# Patient Record
Sex: FEMALE | Race: WHITE | NOT HISPANIC OR LATINO | Employment: OTHER | URBAN - METROPOLITAN AREA
[De-identification: names, ages, dates, MRNs, and addresses within clinical notes are randomized per-mention and may not be internally consistent; named-entity substitution may affect disease eponyms.]

---

## 2017-01-10 ENCOUNTER — CONVERSION ENCOUNTER (OUTPATIENT)
Dept: GENERAL RADIOLOGY | Facility: HOSPITAL | Age: 47
End: 2017-01-10

## 2017-01-20 ENCOUNTER — CONVERSION ENCOUNTER (OUTPATIENT)
Dept: GENERAL RADIOLOGY | Facility: HOSPITAL | Age: 47
End: 2017-01-20

## 2018-02-13 ENCOUNTER — CONVERSION ENCOUNTER (OUTPATIENT)
Dept: GENERAL RADIOLOGY | Facility: HOSPITAL | Age: 48
End: 2018-02-13

## 2019-01-09 ENCOUNTER — OFFICE VISIT CONVERTED (OUTPATIENT)
Dept: ORTHOPEDIC SURGERY | Facility: CLINIC | Age: 49
End: 2019-01-09
Attending: ORTHOPAEDIC SURGERY

## 2019-01-25 ENCOUNTER — OFFICE VISIT CONVERTED (OUTPATIENT)
Dept: ORTHOPEDIC SURGERY | Facility: CLINIC | Age: 49
End: 2019-01-25
Attending: ORTHOPAEDIC SURGERY

## 2019-03-26 ENCOUNTER — HOSPITAL ENCOUNTER (OUTPATIENT)
Dept: GENERAL RADIOLOGY | Facility: HOSPITAL | Age: 49
Discharge: HOME | End: 2019-03-26
Attending: OBSTETRICS & GYNECOLOGY

## 2019-08-20 ENCOUNTER — LAB (OUTPATIENT)
Dept: LAB | Facility: HOSPITAL | Age: 49
End: 2019-08-20

## 2019-08-20 ENCOUNTER — HOSPITAL ENCOUNTER (OUTPATIENT)
Dept: CARDIOLOGY | Facility: HOSPITAL | Age: 49
Discharge: HOME | End: 2019-08-20
Admitting: ORTHOPAEDIC SURGERY

## 2019-08-20 ENCOUNTER — TRANSCRIBE ORDERS (OUTPATIENT)
Dept: LAB | Facility: HOSPITAL | Age: 49
End: 2019-08-20

## 2019-08-20 DIAGNOSIS — Z01.818 PRE-OP TESTING: Primary | ICD-10-CM

## 2019-08-20 DIAGNOSIS — Z01.818 PRE-OP TESTING: ICD-10-CM

## 2019-08-20 LAB
ALBUMIN SERPL-MCNC: 5 G/DL (ref 3.5–5.2)
ALBUMIN/GLOB SERPL: 2 G/DL
ALP SERPL-CCNC: 73 U/L (ref 39–117)
ALT SERPL W P-5'-P-CCNC: 24 U/L (ref 1–33)
ANION GAP SERPL CALCULATED.3IONS-SCNC: 10.7 MMOL/L (ref 5–15)
AST SERPL-CCNC: 27 U/L (ref 1–32)
BASOPHILS # BLD AUTO: 0.03 10*3/MM3 (ref 0–0.2)
BASOPHILS NFR BLD AUTO: 0.4 % (ref 0–1.5)
BILIRUB SERPL-MCNC: 0.6 MG/DL (ref 0.2–1.2)
BUN BLD-MCNC: 9 MG/DL (ref 6–20)
BUN/CREAT SERPL: 12.5 (ref 7–25)
CALCIUM SPEC-SCNC: 9.9 MG/DL (ref 8.6–10.5)
CHLORIDE SERPL-SCNC: 100 MMOL/L (ref 98–107)
CO2 SERPL-SCNC: 26.3 MMOL/L (ref 22–29)
CREAT BLD-MCNC: 0.72 MG/DL (ref 0.57–1)
DEPRECATED RDW RBC AUTO: 45.5 FL (ref 37–54)
EOSINOPHIL # BLD AUTO: 0.03 10*3/MM3 (ref 0–0.4)
EOSINOPHIL NFR BLD AUTO: 0.4 % (ref 0.3–6.2)
ERYTHROCYTE [DISTWIDTH] IN BLOOD BY AUTOMATED COUNT: 13.7 % (ref 12.3–15.4)
GFR SERPL CREATININE-BSD FRML MDRD: 86 ML/MIN/1.73
GLOBULIN UR ELPH-MCNC: 2.5 GM/DL
GLUCOSE BLD-MCNC: 81 MG/DL (ref 65–99)
HCT VFR BLD AUTO: 41.8 % (ref 34–46.6)
HGB BLD-MCNC: 13.4 G/DL (ref 12–15.9)
IMM GRANULOCYTES # BLD AUTO: 0.02 10*3/MM3 (ref 0–0.05)
IMM GRANULOCYTES NFR BLD AUTO: 0.3 % (ref 0–0.5)
LYMPHOCYTES # BLD AUTO: 1.95 10*3/MM3 (ref 0.7–3.1)
LYMPHOCYTES NFR BLD AUTO: 28.3 % (ref 19.6–45.3)
MCH RBC QN AUTO: 28.9 PG (ref 26.6–33)
MCHC RBC AUTO-ENTMCNC: 32.1 G/DL (ref 31.5–35.7)
MCV RBC AUTO: 90.1 FL (ref 79–97)
MONOCYTES # BLD AUTO: 0.53 10*3/MM3 (ref 0.1–0.9)
MONOCYTES NFR BLD AUTO: 7.7 % (ref 5–12)
NEUTROPHILS # BLD AUTO: 4.34 10*3/MM3 (ref 1.7–7)
NEUTROPHILS NFR BLD AUTO: 62.9 % (ref 42.7–76)
NRBC BLD AUTO-RTO: 0 /100 WBC (ref 0–0.2)
PLATELET # BLD AUTO: 268 10*3/MM3 (ref 140–450)
PMV BLD AUTO: 10.3 FL (ref 6–12)
POTASSIUM BLD-SCNC: 4.4 MMOL/L (ref 3.5–5.2)
PROT SERPL-MCNC: 7.5 G/DL (ref 6–8.5)
RBC # BLD AUTO: 4.64 10*6/MM3 (ref 3.77–5.28)
SODIUM BLD-SCNC: 137 MMOL/L (ref 136–145)
WBC NRBC COR # BLD: 6.9 10*3/MM3 (ref 3.4–10.8)

## 2019-08-20 PROCEDURE — 85025 COMPLETE CBC W/AUTO DIFF WBC: CPT

## 2019-08-20 PROCEDURE — 36415 COLL VENOUS BLD VENIPUNCTURE: CPT

## 2019-08-20 PROCEDURE — 80053 COMPREHEN METABOLIC PANEL: CPT

## 2019-08-20 PROCEDURE — 93005 ELECTROCARDIOGRAM TRACING: CPT | Performed by: ORTHOPAEDIC SURGERY

## 2019-08-20 PROCEDURE — 93010 ELECTROCARDIOGRAM REPORT: CPT | Performed by: INTERNAL MEDICINE

## 2020-02-04 ENCOUNTER — OFFICE VISIT CONVERTED (OUTPATIENT)
Dept: SURGERY | Facility: CLINIC | Age: 50
End: 2020-02-04
Attending: NURSE PRACTITIONER

## 2020-02-10 ENCOUNTER — OFFICE VISIT CONVERTED (OUTPATIENT)
Dept: ORTHOPEDIC SURGERY | Facility: CLINIC | Age: 50
End: 2020-02-10
Attending: ORTHOPAEDIC SURGERY

## 2020-02-10 ENCOUNTER — CONVERSION ENCOUNTER (OUTPATIENT)
Dept: ORTHOPEDIC SURGERY | Facility: CLINIC | Age: 50
End: 2020-02-10

## 2020-03-02 ENCOUNTER — HOSPITAL ENCOUNTER (OUTPATIENT)
Dept: URGENT CARE | Facility: CLINIC | Age: 50
Discharge: HOME | End: 2020-03-02
Attending: NURSE PRACTITIONER

## 2020-03-16 ENCOUNTER — HOSPITAL ENCOUNTER (OUTPATIENT)
Dept: GASTROENTEROLOGY | Facility: HOSPITAL | Age: 50
Setting detail: HOSPITAL OUTPATIENT SURGERY
Discharge: HOME | End: 2020-03-16
Attending: SURGERY

## 2020-03-16 LAB — HCG UR QL: NEGATIVE

## 2020-09-15 ENCOUNTER — HOSPITAL ENCOUNTER (OUTPATIENT)
Dept: GENERAL RADIOLOGY | Facility: HOSPITAL | Age: 50
Discharge: HOME | End: 2020-09-15
Attending: OBSTETRICS & GYNECOLOGY

## 2020-11-04 ENCOUNTER — CONVERSION ENCOUNTER (OUTPATIENT)
Dept: ORTHOPEDIC SURGERY | Facility: CLINIC | Age: 50
End: 2020-11-04

## 2020-11-04 ENCOUNTER — OFFICE VISIT CONVERTED (OUTPATIENT)
Dept: ORTHOPEDIC SURGERY | Facility: CLINIC | Age: 50
End: 2020-11-04
Attending: ORTHOPAEDIC SURGERY

## 2020-11-05 ENCOUNTER — HOSPITAL ENCOUNTER (OUTPATIENT)
Dept: GENERAL RADIOLOGY | Facility: HOSPITAL | Age: 50
Discharge: HOME | End: 2020-11-05
Attending: OBSTETRICS & GYNECOLOGY

## 2020-11-30 ENCOUNTER — OFFICE VISIT CONVERTED (OUTPATIENT)
Dept: FAMILY MEDICINE CLINIC | Facility: CLINIC | Age: 50
End: 2020-11-30
Attending: PHYSICIAN ASSISTANT

## 2020-11-30 ENCOUNTER — CONVERSION ENCOUNTER (OUTPATIENT)
Dept: FAMILY MEDICINE CLINIC | Facility: CLINIC | Age: 50
End: 2020-11-30

## 2021-01-05 ENCOUNTER — HOSPITAL ENCOUNTER (OUTPATIENT)
Dept: LAB | Facility: HOSPITAL | Age: 51
Discharge: HOME | End: 2021-01-05
Attending: PHYSICIAN ASSISTANT

## 2021-01-05 LAB
25(OH)D3 SERPL-MCNC: 44.8 NG/ML (ref 30–100)
ALBUMIN SERPL-MCNC: 4.3 G/DL (ref 3.5–5)
ALBUMIN/GLOB SERPL: 1.5 {RATIO} (ref 1.4–2.6)
ALP SERPL-CCNC: 76 U/L (ref 42–98)
ALT SERPL-CCNC: 36 U/L (ref 10–40)
ANION GAP SERPL CALC-SCNC: 14 MMOL/L (ref 8–19)
AST SERPL-CCNC: 43 U/L (ref 15–50)
BASOPHILS # BLD AUTO: 0.03 10*3/UL (ref 0–0.2)
BASOPHILS NFR BLD AUTO: 0.4 % (ref 0–3)
BILIRUB SERPL-MCNC: 0.55 MG/DL (ref 0.2–1.3)
BUN SERPL-MCNC: 13 MG/DL (ref 5–25)
BUN/CREAT SERPL: 15 {RATIO} (ref 6–20)
CALCIUM SERPL-MCNC: 9.1 MG/DL (ref 8.7–10.4)
CHLORIDE SERPL-SCNC: 102 MMOL/L (ref 99–111)
CHOLEST SERPL-MCNC: 218 MG/DL (ref 107–200)
CHOLEST/HDLC SERPL: 2.3 {RATIO} (ref 3–6)
CONV ABS IMM GRAN: 0.07 10*3/UL (ref 0–0.2)
CONV CO2: 25 MMOL/L (ref 22–32)
CONV IMMATURE GRAN: 0.9 % (ref 0–1.8)
CONV TOTAL PROTEIN: 7.1 G/DL (ref 6.3–8.2)
CREAT UR-MCNC: 0.89 MG/DL (ref 0.5–0.9)
DEPRECATED RDW RBC AUTO: 45.4 FL (ref 36.4–46.3)
EOSINOPHIL # BLD AUTO: 0.05 10*3/UL (ref 0–0.7)
EOSINOPHIL # BLD AUTO: 0.6 % (ref 0–7)
ERYTHROCYTE [DISTWIDTH] IN BLOOD BY AUTOMATED COUNT: 14 % (ref 11.7–14.4)
FOLATE SERPL-MCNC: 9.2 NG/ML (ref 4.8–20)
GFR SERPLBLD BASED ON 1.73 SQ M-ARVRAT: >60 ML/MIN/{1.73_M2}
GLOBULIN UR ELPH-MCNC: 2.8 G/DL (ref 2–3.5)
GLUCOSE SERPL-MCNC: 74 MG/DL (ref 65–99)
HCT VFR BLD AUTO: 37.8 % (ref 37–47)
HDLC SERPL-MCNC: 95 MG/DL (ref 40–60)
HGB BLD-MCNC: 12 G/DL (ref 12–16)
LDLC SERPL CALC-MCNC: 110 MG/DL (ref 70–100)
LYMPHOCYTES # BLD AUTO: 1.78 10*3/UL (ref 1–5)
LYMPHOCYTES NFR BLD AUTO: 22.8 % (ref 20–45)
MCH RBC QN AUTO: 28.4 PG (ref 27–31)
MCHC RBC AUTO-ENTMCNC: 31.7 G/DL (ref 33–37)
MCV RBC AUTO: 89.4 FL (ref 81–99)
MONOCYTES # BLD AUTO: 0.45 10*3/UL (ref 0.2–1.2)
MONOCYTES NFR BLD AUTO: 5.8 % (ref 3–10)
NEUTROPHILS # BLD AUTO: 5.43 10*3/UL (ref 2–8)
NEUTROPHILS NFR BLD AUTO: 69.5 % (ref 30–85)
NRBC CBCN: 0 % (ref 0–0.7)
OSMOLALITY SERPL CALC.SUM OF ELEC: 283 MOSM/KG (ref 273–304)
PLATELET # BLD AUTO: 351 10*3/UL (ref 130–400)
PMV BLD AUTO: 10.2 FL (ref 9.4–12.3)
POTASSIUM SERPL-SCNC: 3.9 MMOL/L (ref 3.5–5.3)
RBC # BLD AUTO: 4.23 10*6/UL (ref 4.2–5.4)
SODIUM SERPL-SCNC: 137 MMOL/L (ref 135–147)
TRIGL SERPL-MCNC: 66 MG/DL (ref 40–150)
VIT B12 SERPL-MCNC: 566 PG/ML (ref 211–911)
VLDLC SERPL-MCNC: 13 MG/DL (ref 5–37)
WBC # BLD AUTO: 7.81 10*3/UL (ref 4.8–10.8)

## 2021-04-08 ENCOUNTER — HOSPITAL ENCOUNTER (OUTPATIENT)
Dept: VACCINE CLINIC | Facility: HOSPITAL | Age: 51
Discharge: HOME | End: 2021-04-08
Attending: INTERNAL MEDICINE

## 2021-04-29 ENCOUNTER — HOSPITAL ENCOUNTER (OUTPATIENT)
Dept: VACCINE CLINIC | Facility: HOSPITAL | Age: 51
Discharge: HOME | End: 2021-04-29
Attending: INTERNAL MEDICINE

## 2021-05-05 ENCOUNTER — OFFICE VISIT CONVERTED (OUTPATIENT)
Dept: ORTHOPEDIC SURGERY | Facility: CLINIC | Age: 51
End: 2021-05-05
Attending: PHYSICIAN ASSISTANT

## 2021-05-10 NOTE — H&P
History and Physical      Patient Name: Melissa Ruth   Patient ID: [id number]   Sex: Female   YOB: 1970    Primary Care Provider: Cathy DENNIS   Referring Provider: Cathy DENNIS    Visit Date: November 4, 2020    Provider: Emery Sanford MD   Location: [facility]   Location Address: [address] [place] [address]  [id number]   Location Phone: [phone]          Chief Complaint  · Left Knee Pain      History Of Present Illness  Melissa Ruth is a 50 year old /White female who presents today to [facility].      Patient presents today with a chief complaint of left knee pain. Patient presents today with MRI results from 7/2/20. Patient states her knee is catching and has a lot of pain on the medial and lateral joint line. She states she has some pain on the posterior aspect of her knee as well. She was given a knee brace to wear for 3 months because her knee was shifted over that did help give her some relief of pain.       Past Medical History  Alcoholism; Allergies; Anxiety; Arthritis; Arthritis; Bursitis: Pre-Patella, Left; Chemical dependency; Depression; Gastrocnemius strain, left; Hypothyroidism; Hypothyroidism; Night sweats; Seasonal allergies; Thyroid disorder; Ulcer         Past Surgical History  *Metal Implant; Colonoscopy; Knee surgery; Metal implants; Tonsillectomy; Wrist Surgery         Medication List  Diflucan 100 mg oral tablet; nystatin 100,000 unit/mL oral suspension; OsmoPrep 1.5 gram oral tablet; Tirosint 88 mcg oral capsule         Allergy List  PENICILLINS       Allergies Reconciled  Family Medical History  Lung Neoplasm, Malignant; Stroke; Family history of certain chronic disabling diseases; arthritis; Family history of Arthritis         Social History  Alcohol Use (Never); Claustophobic (Unknown); lives with children; lives with spouse; .; Recreational Drug Use (Never); Tobacco (Former); Working         Review of  "Systems  · Constitutional  o Denies  o : fever, chills, weight loss  · Cardiovascular  o Denies  o : chest pain, shortness of breath  · Gastrointestinal  o Denies  o : liver disease, heartburn, nausea, blood in stools  · Genitourinary  o Denies  o : painful urination, blood in urine  · Integument  o Denies  o : rash, itching  · Neurologic  o Denies  o : headache, weakness, loss of consciousness  · Musculoskeletal  o Denies  o : painful, swollen joints  · Psychiatric  o Denies  o : drug/alcohol addiction, anxiety, depression      Vitals  Date Time BP Position Site L\R Cuff Size HR RR TEMP (F) WT  HT  BMI kg/m2 BSA m2 O2 Sat FR L/min FiO2 HC       11/04/2020 01:02 PM         129lbs 0oz 5'  5\" 21.47 1.64             Physical Examination  · Constitutional  o Appearance  o : well developed, well-nourished, no obvious deformities present  · Head and Face  o Head  o :   § Inspection  § : normocephalic  o Face  o :   § Inspection  § : no facial lesions  · Eyes  o Conjunctivae  o : conjunctivae normal  o Sclerae  o : sclerae white  · Ears, Nose, Mouth and Throat  o Ears  o :   § External Ears  § : appearance within normal limits  § Hearing  § : intact  o Nose  o :   § External Nose  § : appearance normal  · Neck  o Inspection/Palpation  o : normal appearance  o Range of Motion  o : full range of motion  · Respiratory  o Respiratory Effort  o : breathing unlabored  o Inspection of Chest  o : normal appearance  o Auscultation of Lungs  o : no audible wheezing or rales  · Cardiovascular  o Heart  o : regular rate  · Gastrointestinal  o Abdominal Examination  o : soft and non-tender  · Skin and Subcutaneous Tissue  o General Inspection  o : intact, no rashes  · Psychiatric  o General  o : Alert and oriented x3  o Judgement and Insight  o : judgment and insight intact  o Mood and Affect  o : mood normal, affect appropriate  · Left Knee  o Inspection  o : Sensation grossly intact. Neurovascular intact. Pulses normal. Calf supple, " non-tender. Skin intact. Mild swelling. No skin discoloration or atrophy. Full weightbearing. Good strength in quadriceps, hamstrings, dorsiflexors, and plantar flexors. Full flexion and extension.  · In Office Procedures  o View  o : LAT/SUNRISE/STANDING   o Site  o : left, knee  o Indication  o : Left Knee Pain  o Study  o : X-rays ordered, taken in the office, and reviewed today.  o Xray  o : Negative signs for fracture or dislocation. Cartilage of the posterior aspect of the knee. Mild to moderate osteoarthritis. [employer] rides a little bit high.   · Imaging  o Imaging  o : [MRI 7/2/20] 1. Progressive superficial prepatellar and infrapatellar bursal fluid/edema/bursitis since January 2020. 2. Diminished effusion and popliteal cyst. 3. Patellofemoral chondromalacia is unchanged. Minimal marrow edema of the patella is noted medially. Trochlear marrow edema has decreased. 4. Medial compartment chondromalacia is stable. Tibial marrow edema has resolved. 5. Minimal blunting free margin posterior horn medial meniscus without a distinct tear or change. 6. Posterior lateral tibial chondromalacia is unchanged. Underlying marrow edema is minimally improved. 7. Stable tiny undersurface tear posterior body lateral meniscus. History sheet. No fracture or loose body. 8. Progressive subarticular cystlike change and marrow edema posterior superior nonweightbearing medial femoral condyle is stable overlying moderate grade chondromalacia.           Assessment  · Primary osteoarthritis of left knee     715.16/M17.12  · Left knee pain, unspecified chronicity     719.46/M25.562      Plan  · Orders  o Knee (Left) [facility] ([id number]) - 719.46/M25.562 - 11/04/2020  · Medications  o Medications have been Reconciled  o Transition of Care or Provider Policy  · Instructions  o Dr. Sanford saw and examined the patient and agrees with plan.   o MRI reviewed by Dr. Sanford.  o Reviewed the patient's Past Medical, Social, and Family history  as well as the ROS at today's visit, no changes.  o Call or return if worsening symptoms.  o Follow Up PRN.  o This note was transcribed by Rosa Osman. sallie  o Discussed diagnosis and treatment options with the patient. Patient opted to see if her insurance will approve of the gel injection.            Electronically Signed by: Rosa Osman-, Other -Author on November 5, 2020 11:42:42 AM  Electronically Co-signed by: Emery Sanford MD -Reviewer on November 5, 2020 08:09:18 PM

## 2021-05-10 NOTE — H&P
History and Physical      Patient Name: Melissa Ruth   Patient ID: [id number]   Sex: Female   YOB: 1970    Primary Care Provider: Lluvia ZAIDI   Referring Provider: Cathy DENNIS    Visit Date: November 30, 2020    Provider: DEJUAN Carrero   Location: [facility]   Location Address: [address] [place] [address], [address]  [place], [place]  [id number]   Location Phone: [phone]          Chief Complaint  · new patient - establish care      History Of Present Illness  Melissa Ruth is a 50 year old /White female who presents for evaluation and treatment of:      new patient to establish care.   previously saw Leah Lee NP in [place].    patient requesting flu shot  refill Vitamin D3; h/o b12 deficiency.    PMH - hypothyroidism    hypothyroidism - taking tirosint. patient states she is doing well on medication; followed by Dr. RADHA la 6mth  patient had COVID19 beginning of October, doing well now    last pap - 08/2020; Marcela  mammo - 04/2020; CHATO  colon - 03/2020; Dr. Armstrong  labs - 01/2020       Past Medical History  Disease Name Date Onset Notes   Alcoholism --  --    Allergies --  --    Anxiety --  --    Arthritis --  --    Arthritis --  --    Bursitis: Pre-Patella, Left 09/05/2017 --    Chemical dependency --  --    Depression --  --    Gastrocnemius strain, left 09/05/2017 --    Hypothyroidism --  --    Hypothyroidism 08/18/2015 --    Night sweats --  --    Seasonal allergies --  --    Thyroid disorder --  --    Ulcer --  --          Past Surgical History  Procedure Name Date Notes   *Metal Implant --  --    Colonoscopy --  --    Knee surgery 1997 --    Metal implants --  --    Tonsillectomy --  --    Wrist Surgery 2014 --          Medication List  Name Date Started Instructions   Tirosint 88 mcg oral capsule  take 1 capsule (88 mcg) by oral route once daily   Vitamin D3 125 mcg (5,000 unit) oral tablet 11/30/2020 take 1 tablet by oral route  daily for 30 days         Allergy List  Allergen Name Date Reaction Notes   PENICILLINS --  --  --        Allergies Reconciled  Family Medical History  Disease Name Relative/Age Notes   Lung Neoplasm, Malignant Uncle/   --    Stroke Father/   Father  Father  grandparents   Family history of certain chronic disabling diseases; arthritis Father/  Mother/   Mother; Father  Mother   Family history of Arthritis Brother/  Father/  Mother/   Mother; Father; Brother  Mother; Father         Social History  Finding Status Start/Stop Quantity Notes   Alcohol Use Never --/-- --  does not drink   Claustophobic Unknown --/-- --  yes   lives with children --  --/-- --  --    lives with spouse --  --/-- --  --    . --  --/-- --  --    Recreational Drug Use Never --/-- --  no  no  no  in the past   Tobacco Former --/-- 1 PPD former smoker, 1 packs per day, smoked 6-10 years  former smoker, smoked 6-10 years  former smoker, smoked 6-10 years  former smoker  smoked 12-13 years   Working --  --/-- --  --          Review of Systems  · Constitutional  o Denies  o : fever, headache, chills  · Eyes  o Denies  o : eye pain, double vision, blurred vision  · HENT  o Denies  o : sinus problems, sore throat, ear infection  · Cardiovascular  o Denies  o : chest pain, high blood pressure, varicosities  · Respiratory  o Denies  o : shortness of breath, wheezing, frequent cough  · Gastrointestinal  o Denies  o : nausea, vomiting, heartburn, indigestion, abdominal pain  · Genitourinary  o Denies  o : urgency, frequency, urinary retention, painful urination  · Integument  o Denies  o : rash, itching, boils  · Neurologic  o Denies  o : tingling or numbness, tremors, dizzy spells  · Musculoskeletal  o Denies  o : joint pain, neck pain, back pain  · Endocrine  o Denies  o : cold intolerance, heat intolerance, tired, excessive thirst, sluggish  · Psychiatric  o Denies  o : severe depression, concerns with hurting  "themselves  · Heme-Lymph  o Denies  o : swollen glands, blood clotting problems  · Allergic-Immunologic  o Denies  o : sinus allergy symptoms, hay fever      Vitals  Date Time BP Position Site L\R Cuff Size HR RR TEMP (F) WT  HT  BMI kg/m2 BSA m2 O2 Sat FR L/min FiO2 HC       02/10/2020 01:28 PM      79 - R   129lbs 6oz 5'  5\" 21.53 1.64 99 %      11/04/2020 01:02 PM         129lbs 0oz 5'  5\" 21.47 1.64       11/30/2020 10:08 /83 Sitting    74 - R  97.5 131lbs 8oz 5'  5\" 21.88 1.65 100 %  21%          Physical Examination  · Constitutional  o Appearance  o : well developed, well-nourished, no acute distress  · Head and Face  o Head  o : normocephalic, atraumatic  · Neck  o Inspection/Palpation  o : normal appearance, no masses or tenderness, trachea midline  o Thyroid  o : gland size normal, nontender, no nodules or masses present on palpation  · Respiratory  o Respiratory Effort  o : breathing unlabored  o Inspection of Chest  o : chest rise symmetric bilaterally  o Auscultation of Lungs  o : clear to auscultation bilaterally throughout inspiration and expiration  · Cardiovascular  o Heart  o :   § Auscultation of Heart  § : regular rate and rhythm, no murmurs, gallops or rubs  o Peripheral Vascular System  o :   § Extremities  § : no edema  · Lymphatic  o Neck  o : no cervical lymphadenopathy, no supraclavicular lymphadenopathy  · Psychiatric  o Mood and Affect  o : mood normal, affect appropriate              Assessment  · Screening for depression     V79.0/Z13.89  · Need for influenza vaccination     V04.81/Z23  · Screening for lipid disorders     V77.91/Z13.220  · Hypothyroidism     244.9/E03.9  · Vitamin D deficiency     268.9/E55.9  · B12 deficiency     266.2/E53.8  · Establishing care with new doctor, encounter for     V65.8/Z76.89    Problems Reconciled  Plan  · Orders  o ACO-18: Negative screen for clinical depression using a standardized tool () - V79.0/Z13.89 - 11/30/2020   0  o Immunization " Admin Fee (Single) ([facility]) (44083) - V04.81/Z23 - 11/30/2020  o Fluzone Quadrivalent Vaccine, age 6 months + (50647) - V04.81/Z23 - 11/30/2020   Vaccine - Influenza; Dose: 0.5; Site: Right Deltoid; Route: Intramuscular; Date: 11/30/2020 10:18:00; Exp: 06/30/2021; Lot: [id number]; Mfg: Clip pasteur; TradeName: Fluzone Quadrivalent; Administered By: Misty Pardo MA; Comment: NDC: [id number] patient tolerated well  o ACO-14: Influenza immunization administered or previously received () - V04.81/Z23 - 11/30/2020  o Lipid Panel [facility] (48730) - V77.91/Z13.220 - 11/30/2020  o Vitamin D Level (43442) - 268.9/E55.9 - 11/30/2020  o CBC with Auto Diff [facility] (05171) - 268.9/E55.9, 266.2/E53.8 - 11/30/2020  o CMP [facility] (00881) - V77.91/Z13.220, 268.9/E55.9, 266.2/E53.8 - 11/30/2020  o ACO-39: Current medications updated and reviewed (, 1159F) - - 11/30/2020  o B12 Folate levels (B12FO) - 266.2/E53.8 - 11/30/2020  · Medications  o Vitamin D3 125 mcg (5,000 unit) oral tablet   SIG: take 1 tablet by oral route daily for 30 days   DISP: (30) Tablet with 5 refills  Prescribed on 11/30/2020     o Medications have been Reconciled  o Transition of Care or Provider Policy  · Instructions  o Depression Screen completed and scanned into the EMR under the designated folder within the patient's documents.  o Today's PHQ-9 result is _0__  o Take all medications as prescribed/directed.  o Patient was educated/instructed on their diagnosis, treatment and medications prior to discharge from the clinic today.  o Chronic conditions reviewed and taken into consideration for today's treatment plan.  o Discussed Covid-19 precautions including, but not limited to, social distancing, avoid touching your face, and hand washing.   · Disposition  o Follow Up in 6 months.            Electronically Signed by: DEJUAN Carrero -Author on November 30, 2020 10:59:18 AM

## 2021-05-12 ENCOUNTER — OFFICE VISIT CONVERTED (OUTPATIENT)
Dept: ORTHOPEDIC SURGERY | Facility: CLINIC | Age: 51
End: 2021-05-12
Attending: PHYSICIAN ASSISTANT

## 2021-05-14 VITALS
DIASTOLIC BLOOD PRESSURE: 83 MMHG | WEIGHT: 131.5 LBS | HEIGHT: 65 IN | BODY MASS INDEX: 21.91 KG/M2 | SYSTOLIC BLOOD PRESSURE: 121 MMHG | TEMPERATURE: 97.5 F | OXYGEN SATURATION: 100 % | HEART RATE: 74 BPM

## 2021-05-14 VITALS — WEIGHT: 129 LBS | HEIGHT: 65 IN | BODY MASS INDEX: 21.49 KG/M2

## 2021-05-15 VITALS — BODY MASS INDEX: 21.49 KG/M2 | RESPIRATION RATE: 12 BRPM | WEIGHT: 129 LBS | HEIGHT: 65 IN

## 2021-05-15 VITALS — HEIGHT: 65 IN | BODY MASS INDEX: 21.55 KG/M2 | HEART RATE: 79 BPM | OXYGEN SATURATION: 99 % | WEIGHT: 129.37 LBS

## 2021-05-16 VITALS — WEIGHT: 129 LBS | HEIGHT: 65 IN | BODY MASS INDEX: 21.49 KG/M2 | OXYGEN SATURATION: 98 % | HEART RATE: 71 BPM

## 2021-05-16 VITALS — WEIGHT: 129 LBS | BODY MASS INDEX: 21.49 KG/M2 | HEIGHT: 65 IN

## 2021-05-19 ENCOUNTER — CONVERSION ENCOUNTER (OUTPATIENT)
Dept: ORTHOPEDIC SURGERY | Facility: CLINIC | Age: 51
End: 2021-05-19

## 2021-05-19 ENCOUNTER — OFFICE VISIT CONVERTED (OUTPATIENT)
Dept: ORTHOPEDIC SURGERY | Facility: CLINIC | Age: 51
End: 2021-05-19
Attending: PHYSICIAN ASSISTANT

## 2021-06-05 NOTE — PROGRESS NOTES
Progress Note      Patient Name: Melissa Ruth   Patient ID: [id number]   Sex: Female   YOB: 1970    Primary Care Provider: Lluvia ZAIDI   Referring Provider: Cathy DENNIS    Visit Date: May 19, 2021    Provider: Trish Sun PA-C   Location: [facility]   Location Address: [address] [place] [address]  [id number]   Location Phone: [phone]          Chief Complaint  · Left knee pain       History Of Present Illness  Melissa Ruth is a 51 year old /White female who presents today to [facility]. Patient presents today for left knee pain, left knee osteoarthritis. She is here today for her 3rd Euflexxa injection.       Past Medical History  Alcoholism; Allergies; Anxiety; Arthritis; Arthritis; Bursitis: Pre-Patella, Left; Chemical dependency; Depression; Gastrocnemius strain, left; Hypothyroidism; Hypothyroidism; Night sweats; Seasonal allergies; Thyroid disorder; Ulcer         Past Surgical History  *Metal Implant; Colonoscopy; Knee surgery; Metal implants; Tonsillectomy; Wrist Surgery         Medication List  Tirosint 88 mcg oral capsule; Vitamin D3 125 mcg (5,000 unit) oral tablet         Allergy List  PENICILLINS       Allergies Reconciled  Family Medical History  Lung Neoplasm, Malignant; Stroke; Family history of certain chronic disabling diseases; arthritis; Family history of Arthritis         Social History  Alcohol Use (Never); Claustophobic (Unknown); lives with children; lives with spouse; .; Recreational Drug Use (Never); Tobacco (Former); Working         Immunizations  Name Date Admin   Influenza 11/30/2020         Review of Systems  · Constitutional  o Denies  o : fever, chills, weight loss  · Cardiovascular  o Denies  o : chest pain, shortness of breath  · Gastrointestinal  o Denies  o : liver disease, heartburn, nausea, blood in stools  · Genitourinary  o Denies  o : painful urination, blood in  "urine  · Integument  o Denies  o : rash, itching  · Neurologic  o Denies  o : headache, weakness, loss of consciousness  · Musculoskeletal  o Denies  o : painful, swollen joints  · Psychiatric  o Denies  o : drug/alcohol addiction, anxiety, depression      Vitals  Date Time BP Position Site L\R Cuff Size HR RR TEMP (F) WT  HT  BMI kg/m2 BSA m2 O2 Sat FR L/min FiO2        05/19/2021 01:11 PM      74 - R   132lbs 4oz 5'  5\" 22.01 1.66 98 %            Physical Examination  · Constitutional  o Appearance  o : well developed, well-nourished, no obvious deformities present  · Head and Face  o Head  o :   § Inspection  § : normocephalic  o Face  o :   § Inspection  § : no facial lesions  · Eyes  o Conjunctivae  o : conjunctivae normal  o Sclerae  o : sclerae white  · Ears, Nose, Mouth and Throat  o Ears  o :   § External Ears  § : appearance within normal limits  § Hearing  § : intact  o Nose  o :   § External Nose  § : appearance normal  · Neck  o Inspection/Palpation  o : normal appearance  o Range of Motion  o : full range of motion  · Respiratory  o Respiratory Effort  o : breathing unlabored  o Inspection of Chest  o : normal appearance  o Auscultation of Lungs  o : no audible wheezing or rales  · Cardiovascular  o Heart  o : regular rate  · Gastrointestinal  o Abdominal Examination  o : soft and non-tender  · Skin and Subcutaneous Tissue  o General Inspection  o : intact, no rashes  · Psychiatric  o General  o : Alert and oriented x3  o Judgement and Insight  o : judgment and insight intact  o Mood and Affect  o : mood normal, affect appropriate  · Left Knee  o Inspection  o : No atrophy, discoloration, tenderness, deformity or swelling. Tenderness of medial joint line. Full AROM with flexion and extension. Sensation intact. N/v intact. Calf supple, nontender.   · Injection Note/Aspiration Note  o Site  o : left knee  o Procedure  o : Procedure: After educating the patient, patient gave consent for procedure. After " using Chloraprep, the joint space was injected. The patient tolerated the procedure well.  o Medication  o : Sample Euflexxa, 20 mg          Assessment  · Primary osteoarthritis of left knee     715.16/M17.12      Plan  · Orders  o Euflexxa per dose () - 715.16/M17.12 - 05/19/2021   Lot [id number] Exp 09 28 2022 Ferraida Administered by Trish Sun PA-C   o Knee Intra-articular Injection without US Guidance [facility] (05255) - 715.16/M17.12 - 05/19/2021   Administered by Trish Sun PA-C   · Medications  o Medications have been Reconciled  o Transition of Care or Provider Policy  · Instructions  o Reviewed the patient's Past Medical, Social, and Family history as well as the ROS at today's visit, no changes.  o Call or return if worsening symptoms.  o Patient will follow up in the fall and consider injections for her right knee at that time, if insurance will cover.            Electronically Signed by: Trish Sun PA-C -Author on May 19, 2021 01:22:55 PM

## 2021-06-05 NOTE — PROGRESS NOTES
Progress Note      Patient Name: Melissa Ruth   Patient ID: [id number]   Sex: Female   YOB: 1970    Primary Care Provider: Lluvia ZAIDI   Referring Provider: Cathy DENNIS    Visit Date: May 5, 2021    Provider: Trish Sun PA-C   Location: [facility]   Location Address: [address] [place] [address]  [id number]   Location Phone: [phone]          Chief Complaint  · Left knee pain       History Of Present Illness  Melissa Ruth is a 51 year old /White female who presents today to [facility]. Patient presents today to receive left knee Euflexxa injection #1.       Past Medical History  Alcoholism; Allergies; Anxiety; Arthritis; Arthritis; Bursitis: Pre-Patella, Left; Chemical dependency; Depression; Gastrocnemius strain, left; Hypothyroidism; Hypothyroidism; Night sweats; Seasonal allergies; Thyroid disorder; Ulcer         Past Surgical History  *Metal Implant; Colonoscopy; Knee surgery; Metal implants; Tonsillectomy; Wrist Surgery         Medication List  Tirosint 88 mcg oral capsule; Vitamin D3 125 mcg (5,000 unit) oral tablet         Allergy List  PENICILLINS       Allergies Reconciled  Family Medical History  Lung Neoplasm, Malignant; Stroke; Family history of certain chronic disabling diseases; arthritis; Family history of Arthritis         Social History  Alcohol Use (Never); Claustophobic (Unknown); lives with children; lives with spouse; .; Recreational Drug Use (Never); Tobacco (Former); Working         Immunizations  Name Date Admin   Influenza 11/30/2020         Review of Systems  · Constitutional  o Denies  o : fever, chills, weight loss  · Cardiovascular  o Denies  o : chest pain, shortness of breath  · Gastrointestinal  o Denies  o : liver disease, heartburn, nausea, blood in stools  · Genitourinary  o Denies  o : painful urination, blood in urine  · Integument  o Denies  o : rash, itching  · Neurologic  o Denies  o :  "headache, weakness, loss of consciousness  · Musculoskeletal  o Denies  o : painful, swollen joints  · Psychiatric  o Denies  o : drug/alcohol addiction, anxiety, depression      Vitals  Date Time BP Position Site L\R Cuff Size HR RR TEMP (F) WT  HT  BMI kg/m2 BSA m2 O2 Sat FR L/min FiO2 HC       05/05/2021 01:21 PM      82 - R   130lbs 4oz 5'  5\" 21.67 1.65 92 %            Physical Examination  · Constitutional  o Appearance  o : well developed, well-nourished, no obvious deformities present  · Head and Face  o Head  o :   § Inspection  § : normocephalic  o Face  o :   § Inspection  § : no facial lesions  · Eyes  o Conjunctivae  o : conjunctivae normal  o Sclerae  o : sclerae white  · Ears, Nose, Mouth and Throat  o Ears  o :   § External Ears  § : appearance within normal limits  § Hearing  § : intact  o Nose  o :   § External Nose  § : appearance normal  · Neck  o Inspection/Palpation  o : normal appearance  o Range of Motion  o : full range of motion  · Respiratory  o Respiratory Effort  o : breathing unlabored  o Inspection of Chest  o : normal appearance  o Auscultation of Lungs  o : no audible wheezing or rales  · Cardiovascular  o Heart  o : regular rate  · Gastrointestinal  o Abdominal Examination  o : soft and non-tender  · Skin and Subcutaneous Tissue  o General Inspection  o : intact, no rashes  · Psychiatric  o General  o : Alert and oriented x3  o Judgement and Insight  o : judgment and insight intact  o Mood and Affect  o : mood normal, affect appropriate  · Left Knee  o Inspection  o : No atrophy, swelling, tenderness, or deformity. Muscle strength is 5/5 of the quadriceps and hamstrings. Stable to varus/valgus stress. Sensation is intact. N/v intact. Normal gait. Posterior tibialis pulse is 2+.  · Injection Note/Aspiration Note  o Site  o : left knee  o Procedure  o : Procedure: After educating the patient, patient gave consent for procedure. After using Chloraprep, the joint space was injected. The " patient tolerated the procedure well.  o Medication  o : Euflexxa, 20 mg sample           Assessment  · Primary osteoarthritis of left knee     715.16/M17.12      Plan  · Orders  o Euflexxa per dose () - 715.16/M17.12 - 05/05/2021   Sample Lot [id number] Exp 09 28 2022 Ferraida Administered by Trish Sun PA-C   o Knee Intra-articular Injection without US Guidance [facility] (64007) - 715.16/M17.12 - 05/05/2021   Administered by Trish Sun PA-C  · Medications  o Medications have been Reconciled  o Transition of Care or Provider Policy  · Instructions  o Reviewed the patient's Past Medical, Social, and Family history as well as the ROS at today's visit, no changes.  o Call or return if worsening symptoms.  o Follow Up PRN.            Electronically Signed by: Trish Sun PA-C -Author on May 5, 2021 02:20:25 PM  Electronically Co-signed by: Emery Sanford MD -Reviewer on May 10, 2021 12:11:01 PM

## 2021-06-05 NOTE — PROGRESS NOTES
Progress Note      Patient Name: Melissa Ruth   Patient ID: [id number]   Sex: Female   YOB: 1970    Primary Care Provider: Lluvia ZAIDI   Referring Provider: Cathy DENNIS    Visit Date: May 12, 2021    Provider: Trish Sun PA-C   Location: [facility]   Location Address: [address] [place] [address]  [id number]   Location Phone: [phone]          Chief Complaint  · Left knee pain       History Of Present Illness  Melissa Ruth is a 51 year old /White female who presents today to [facility]. Patient presents today for her second Euflexxa injection of her left knee. She states the first injection gave her relief.       Past Medical History  Alcoholism; Allergies; Anxiety; Arthritis; Arthritis; Bursitis: Pre-Patella, Left; Chemical dependency; Depression; Gastrocnemius strain, left; Hypothyroidism; Hypothyroidism; Night sweats; Seasonal allergies; Thyroid disorder; Ulcer         Past Surgical History  *Metal Implant; Colonoscopy; Knee surgery; Metal implants; Tonsillectomy; Wrist Surgery         Medication List  Tirosint 88 mcg oral capsule; Vitamin D3 125 mcg (5,000 unit) oral tablet         Allergy List  PENICILLINS       Allergies Reconciled  Family Medical History  Lung Neoplasm, Malignant; Stroke; Family history of certain chronic disabling diseases; arthritis; Family history of Arthritis         Social History  Alcohol Use (Never); Claustophobic (Unknown); lives with children; lives with spouse; .; Recreational Drug Use (Never); Tobacco (Former); Working         Immunizations  Name Date Admin   Influenza 11/30/2020         Review of Systems  · Constitutional  o Denies  o : fever, chills, weight loss  · Cardiovascular  o Denies  o : chest pain, shortness of breath  · Gastrointestinal  o Denies  o : liver disease, heartburn, nausea, blood in stools  · Genitourinary  o Denies  o : painful urination, blood in  "urine  · Integument  o Denies  o : rash, itching  · Neurologic  o Denies  o : headache, weakness, loss of consciousness  · Musculoskeletal  o Denies  o : painful, swollen joints  · Psychiatric  o Denies  o : drug/alcohol addiction, anxiety, depression      Vitals  Date Time BP Position Site L\R Cuff Size HR RR TEMP (F) WT  HT  BMI kg/m2 BSA m2 O2 Sat FR L/min FiO2        05/12/2021 09:36 AM      70 - R   130lbs 4oz 5'  5\" 21.67 1.65 98 %            Physical Examination  · Constitutional  o Appearance  o : well developed, well-nourished, no obvious deformities present  · Head and Face  o Head  o :   § Inspection  § : normocephalic  o Face  o :   § Inspection  § : no facial lesions  · Eyes  o Conjunctivae  o : conjunctivae normal  o Sclerae  o : sclerae white  · Ears, Nose, Mouth and Throat  o Ears  o :   § External Ears  § : appearance within normal limits  § Hearing  § : intact  o Nose  o :   § External Nose  § : appearance normal  · Neck  o Inspection/Palpation  o : normal appearance  o Range of Motion  o : full range of motion  · Respiratory  o Respiratory Effort  o : breathing unlabored  o Inspection of Chest  o : normal appearance  o Auscultation of Lungs  o : no audible wheezing or rales  · Cardiovascular  o Heart  o : regular rate  · Gastrointestinal  o Abdominal Examination  o : soft and non-tender  · Skin and Subcutaneous Tissue  o General Inspection  o : intact, no rashes  · Psychiatric  o General  o : Alert and oriented x3  o Judgement and Insight  o : judgment and insight intact  o Mood and Affect  o : mood normal, affect appropriate  · Left Knee  o Inspection  o : No atrophy, tenderness, deformity, swelling or discoloration. Full AROM with flexion and extension. Stable to varus/valgus stress. Muscle strength 5/5 of the quadriceps and hamstrings. Sensation is intact. N/v intact. Calf supple, nontender.   · Injection Note/Aspiration Note  o Site  o : left knee  o Procedure  o : Procedure: After educating " the patient, patient gave consent for procedure. After using Chloraprep, the joint space was injected. The patient tolerated the procedure well.  o Medication  o : Euflexxa Sample , 20 mg          Assessment  · Primary osteoarthritis of left knee     715.16/M17.12      Plan  · Orders  o Euflexxa per dose (Patient supplies med) () - 715.16/M17.12 - 05/12/2021   Sample Lot [id number] Exp 09 28 2022 [place] Administered by Trish Sun PA-C   o Knee Intra-articular Injection without US Guidance [facility] ([id number]) - 715.16/M17.12 - 05/12/2021   Administered by Trish Sun PA-C   · Medications  o Medications have been Reconciled  o Transition of Care or Provider Policy  · Instructions  o Reviewed the patient's Past Medical, Social, and Family history as well as the ROS at today's visit, no changes.  o Call or return if worsening symptoms.  o Follow up in 1 week.            Electronically Signed by: Trish Sun PA-C -Author on May 12, 2021 09:57:19 AM  Electronically Co-signed by: Emery Sanford MD -Reviewer on May 14, 2021 09:20:11 PM

## 2021-07-15 VITALS — BODY MASS INDEX: 22.03 KG/M2 | HEART RATE: 74 BPM | OXYGEN SATURATION: 98 % | HEIGHT: 65 IN | WEIGHT: 132.25 LBS

## 2021-07-15 VITALS — HEART RATE: 82 BPM | WEIGHT: 130.25 LBS | BODY MASS INDEX: 21.7 KG/M2 | OXYGEN SATURATION: 92 % | HEIGHT: 65 IN

## 2021-07-15 VITALS — BODY MASS INDEX: 21.7 KG/M2 | HEART RATE: 70 BPM | OXYGEN SATURATION: 98 % | WEIGHT: 130.25 LBS | HEIGHT: 65 IN

## 2021-07-29 PROBLEM — F32.A DEPRESSION: Status: ACTIVE | Noted: 2021-07-29

## 2021-07-29 PROBLEM — J30.2 SEASONAL ALLERGIC RHINITIS: Status: ACTIVE | Noted: 2021-07-29

## 2021-07-29 PROBLEM — F10.20 ALCOHOLISM (HCC): Status: ACTIVE | Noted: 2021-07-29

## 2021-07-29 PROBLEM — F41.9 ANXIETY: Status: ACTIVE | Noted: 2021-07-29

## 2021-07-29 PROBLEM — F19.20 CHEMICAL DEPENDENCY (HCC): Status: ACTIVE | Noted: 2021-07-29

## 2021-07-30 ENCOUNTER — OFFICE VISIT (OUTPATIENT)
Dept: FAMILY MEDICINE CLINIC | Facility: CLINIC | Age: 51
End: 2021-07-30

## 2021-07-30 VITALS
DIASTOLIC BLOOD PRESSURE: 81 MMHG | TEMPERATURE: 98.1 F | HEART RATE: 63 BPM | HEIGHT: 65 IN | OXYGEN SATURATION: 98 % | BODY MASS INDEX: 21.26 KG/M2 | WEIGHT: 127.6 LBS | SYSTOLIC BLOOD PRESSURE: 115 MMHG

## 2021-07-30 DIAGNOSIS — Z11.59 NEED FOR HEPATITIS C SCREENING TEST: ICD-10-CM

## 2021-07-30 DIAGNOSIS — Z76.0 ENCOUNTER FOR MEDICATION REFILL: ICD-10-CM

## 2021-07-30 DIAGNOSIS — Z13.220 SCREENING, LIPID: ICD-10-CM

## 2021-07-30 DIAGNOSIS — B00.1 HERPES LABIALIS: ICD-10-CM

## 2021-07-30 DIAGNOSIS — J30.89 SEASONAL ALLERGIC RHINITIS DUE TO OTHER ALLERGIC TRIGGER: Chronic | ICD-10-CM

## 2021-07-30 DIAGNOSIS — E03.9 ACQUIRED HYPOTHYROIDISM: Primary | Chronic | ICD-10-CM

## 2021-07-30 PROCEDURE — 99214 OFFICE O/P EST MOD 30 MIN: CPT | Performed by: PHYSICIAN ASSISTANT

## 2021-07-30 RX ORDER — CETIRIZINE HYDROCHLORIDE 10 MG/1
10 TABLET ORAL DAILY
COMMUNITY

## 2021-07-30 RX ORDER — VALACYCLOVIR HYDROCHLORIDE 1 G/1
2000 TABLET, FILM COATED ORAL 2 TIMES DAILY
Qty: 4 TABLET | Refills: 2 | Status: SHIPPED | OUTPATIENT
Start: 2021-07-30 | End: 2022-10-25

## 2021-07-30 RX ORDER — LEVOTHYROXINE SODIUM 88 UG/1
88 CAPSULE ORAL DAILY
COMMUNITY
End: 2021-08-30 | Stop reason: SDUPTHER

## 2021-07-30 NOTE — PROGRESS NOTES
Chief Complaint  Chief Complaint   Patient presents with   • Hypothyroidism     medication refill/follow up        Subjective          Melissa Ruth presents to [facility]  History of Present Illness     Patient is here for follow up and medication refill     She is also c/o right ear itching/pain on and off, she thinks she also has some carpal tunnel in her left thumb area and would also like to know if she can get a rx for fever blister treatment due to getting them a lot in the summer when she is out on the boat.     Hypothyroidism: Patient is currently on Tirosint and doing well. Patient states energy is good. Patient denies weight changes, bowel changes and changes in hair, skin and nails. Previously followed by Dr. Yanez; last labs 3/29/2021    HSV: has several episodes of fever blisters when exposed to sun; requesting medication to help    Medical History: has a past medical history of Alcoholism (CMS/[facility]), Allergies, Anxiety, Arthritis, Bursitis (09/05/2017), Chemical dependency (CMS/[facility]), Condition not found, Depression, Gastrocnemius strain, left (09/05/2017), Hypothyroidism (08/18/2015), Night sweats, Seasonal allergies, and Thyroid disorder.   Surgical History: has a past surgical history that includes Colonoscopy; Other surgical history; Knee surgery (1997); Tonsillectomy; and Wrist surgery (2014).   Family History: family history includes Arthritis in her father and mother; Lung cancer in an other family member; Other in an other family member; Stroke in her father.   Social History: reports that she has quit smoking. She has never used smokeless tobacco. She reports that she does not drink alcohol and does not use drugs.    Allergies: Penicillins    Health Maintenance Due   Topic Date Due   • ANNUAL PHYSICAL  Never done   • Pneumococcal Vaccine 0-64 (1 of 2 - PPSV23) Never done   • COVID-19 Vaccine (1) Never done   • TDAP/TD VACCINES (1 - Tdap) Never done   •  "ZOSTER VACCINE (1 of 2) Never done   • HEPATITIS C SCREENING  Never done       Immunization History   Administered Date(s) Administered   • Flu Vaccine Quad PF >18YRS 11/30/2020   • Influenza, Unspecified 11/30/2020       Objective     Vitals:    07/30/21 1035   BP: 115/81   Pulse: 63   Temp: 98.1 °F (36.7 °C)   TempSrc: Temporal   SpO2: 98%   Weight: 57.9 kg (127 lb 9.6 oz)   Height: 165.1 cm (65\")     Body mass index is 21.23 kg/m².       Physical Exam  Vitals and nursing note reviewed.   Constitutional:       Appearance: Normal appearance.   HENT:      Head: Normocephalic and atraumatic.      Right Ear: Ear canal normal.      Left Ear: Ear canal normal.      Ears:      Comments: TMs dull bilaterally  Neck:      Vascular: No carotid bruit.      Comments: Thyroid : gland size normal, nontender, no nodules or masses present on palpation   Cardiovascular:      Rate and Rhythm: Normal rate and regular rhythm.      Pulses: Normal pulses.      Heart sounds: Normal heart sounds.   Pulmonary:      Effort: Pulmonary effort is normal.      Breath sounds: Normal breath sounds.   Musculoskeletal:      Cervical back: Neck supple. No tenderness.      Right lower leg: No edema.      Left lower leg: No edema.   Lymphadenopathy:      Cervical: No cervical adenopathy.   Neurological:      Mental Status: She is alert.   Psychiatric:         Mood and Affect: Mood normal.         Behavior: Behavior normal.           Result Review :                           Assessment and Plan      Diagnoses and all orders for this visit:    1. Acquired hypothyroidism (Primary)  Comments:  Stable continue current medication; labs due in about 1mth; f/u 6mth from labs.  Orders:  -     TSH; Future  -     T4, Free; Future    2. Seasonal allergic rhinitis due to other allergic trigger  Comments:  Stable; continue current medication; add OTC Flonase as needed.  Orders:  -     CBC & Differential; Future    3. Need for hepatitis C screening test  -     " Hepatitis C Antibody; Future    4. Herpes labialis  Comments:  Trial of Valtrex 1gm take 2 twice daily for 1 day. Administration discussed.  Orders:  -     valACYclovir (Valtrex) 1000 MG tablet; Take 2 tablets by mouth 2 (Two) Times a Day.  Dispense: 4 tablet; Refill: 2    5. Encounter for medication refill    6. Screening, lipid  -     Comprehensive Metabolic Panel; Future  -     Lipid Panel; Future            Follow Up     Return in about 8 months (around 3/30/2022).    Patient was given instructions and counseling regarding her condition or for health maintenance advice. Please see specific information pulled into the AVS if appropriate.

## 2021-08-23 ENCOUNTER — LAB (OUTPATIENT)
Dept: LAB | Facility: HOSPITAL | Age: 51
End: 2021-08-23

## 2021-08-23 DIAGNOSIS — Z13.220 SCREENING, LIPID: ICD-10-CM

## 2021-08-23 DIAGNOSIS — E03.9 ACQUIRED HYPOTHYROIDISM: Chronic | ICD-10-CM

## 2021-08-23 DIAGNOSIS — Z11.59 NEED FOR HEPATITIS C SCREENING TEST: ICD-10-CM

## 2021-08-23 DIAGNOSIS — J30.89 SEASONAL ALLERGIC RHINITIS DUE TO OTHER ALLERGIC TRIGGER: ICD-10-CM

## 2021-08-23 LAB
BASOPHILS # BLD AUTO: 0.03 10*3/MM3 (ref 0–0.2)
BASOPHILS NFR BLD AUTO: 0.4 % (ref 0–1.5)
DEPRECATED RDW RBC AUTO: 41.5 FL (ref 37–54)
EOSINOPHIL # BLD AUTO: 0.04 10*3/MM3 (ref 0–0.4)
EOSINOPHIL NFR BLD AUTO: 0.5 % (ref 0.3–6.2)
ERYTHROCYTE [DISTWIDTH] IN BLOOD BY AUTOMATED COUNT: 12.7 % (ref 12.3–15.4)
HCT VFR BLD AUTO: 39.6 % (ref 34–46.6)
HCV AB SER DONR QL: NORMAL
HGB BLD-MCNC: 12.6 G/DL (ref 12–15.9)
IMM GRANULOCYTES # BLD AUTO: 0.02 10*3/MM3 (ref 0–0.05)
IMM GRANULOCYTES NFR BLD AUTO: 0.3 % (ref 0–0.5)
LYMPHOCYTES # BLD AUTO: 2.52 10*3/MM3 (ref 0.7–3.1)
LYMPHOCYTES NFR BLD AUTO: 34 % (ref 19.6–45.3)
MCH RBC QN AUTO: 28.6 PG (ref 26.6–33)
MCHC RBC AUTO-ENTMCNC: 31.8 G/DL (ref 31.5–35.7)
MCV RBC AUTO: 89.8 FL (ref 79–97)
MONOCYTES # BLD AUTO: 0.48 10*3/MM3 (ref 0.1–0.9)
MONOCYTES NFR BLD AUTO: 6.5 % (ref 5–12)
NEUTROPHILS NFR BLD AUTO: 4.32 10*3/MM3 (ref 1.7–7)
NEUTROPHILS NFR BLD AUTO: 58.3 % (ref 42.7–76)
NRBC BLD AUTO-RTO: 0 /100 WBC (ref 0–0.2)
PLATELET # BLD AUTO: 257 10*3/MM3 (ref 140–450)
PMV BLD AUTO: 10.4 FL (ref 6–12)
RBC # BLD AUTO: 4.41 10*6/MM3 (ref 3.77–5.28)
WBC # BLD AUTO: 7.41 10*3/MM3 (ref 3.4–10.8)

## 2021-08-23 PROCEDURE — 84439 ASSAY OF FREE THYROXINE: CPT

## 2021-08-23 PROCEDURE — 86803 HEPATITIS C AB TEST: CPT

## 2021-08-23 PROCEDURE — 80053 COMPREHEN METABOLIC PANEL: CPT

## 2021-08-23 PROCEDURE — 80061 LIPID PANEL: CPT

## 2021-08-23 PROCEDURE — 84443 ASSAY THYROID STIM HORMONE: CPT

## 2021-08-23 PROCEDURE — 36415 COLL VENOUS BLD VENIPUNCTURE: CPT

## 2021-08-23 PROCEDURE — 85025 COMPLETE CBC W/AUTO DIFF WBC: CPT

## 2021-08-24 LAB
ALBUMIN SERPL-MCNC: 4.6 G/DL (ref 3.5–5.2)
ALBUMIN/GLOB SERPL: 1.9 G/DL
ALP SERPL-CCNC: 85 U/L (ref 39–117)
ALT SERPL W P-5'-P-CCNC: 29 U/L (ref 1–33)
ANION GAP SERPL CALCULATED.3IONS-SCNC: 9.4 MMOL/L (ref 5–15)
AST SERPL-CCNC: 32 U/L (ref 1–32)
BILIRUB SERPL-MCNC: 0.3 MG/DL (ref 0–1.2)
BUN SERPL-MCNC: 14 MG/DL (ref 6–20)
BUN/CREAT SERPL: 16.1 (ref 7–25)
CALCIUM SPEC-SCNC: 9.3 MG/DL (ref 8.6–10.5)
CHLORIDE SERPL-SCNC: 104 MMOL/L (ref 98–107)
CHOLEST SERPL-MCNC: 201 MG/DL (ref 0–200)
CO2 SERPL-SCNC: 23.6 MMOL/L (ref 22–29)
CREAT SERPL-MCNC: 0.87 MG/DL (ref 0.57–1)
GFR SERPL CREATININE-BSD FRML MDRD: 69 ML/MIN/1.73
GLOBULIN UR ELPH-MCNC: 2.4 GM/DL
GLUCOSE SERPL-MCNC: 82 MG/DL (ref 65–99)
HDLC SERPL-MCNC: 90 MG/DL (ref 40–60)
LDLC SERPL CALC-MCNC: 94 MG/DL (ref 0–100)
LDLC/HDLC SERPL: 1.01 {RATIO}
POTASSIUM SERPL-SCNC: 4.1 MMOL/L (ref 3.5–5.2)
PROT SERPL-MCNC: 7 G/DL (ref 6–8.5)
SODIUM SERPL-SCNC: 137 MMOL/L (ref 136–145)
T4 FREE SERPL-MCNC: 1.34 NG/DL (ref 0.93–1.7)
TRIGL SERPL-MCNC: 99 MG/DL (ref 0–150)
TSH SERPL DL<=0.05 MIU/L-ACNC: 1.4 UIU/ML (ref 0.27–4.2)
VLDLC SERPL-MCNC: 17 MG/DL (ref 5–40)

## 2021-08-30 ENCOUNTER — TELEPHONE (OUTPATIENT)
Dept: FAMILY MEDICINE CLINIC | Facility: CLINIC | Age: 51
End: 2021-08-30

## 2021-08-30 RX ORDER — LEVOTHYROXINE SODIUM 88 UG/1
88 CAPSULE ORAL DAILY
Qty: 90 CAPSULE | Refills: 1 | Status: SHIPPED | OUTPATIENT
Start: 2021-08-30 | End: 2022-02-22 | Stop reason: SDUPTHER

## 2021-09-08 ENCOUNTER — OFFICE VISIT (OUTPATIENT)
Dept: ORTHOPEDIC SURGERY | Facility: CLINIC | Age: 51
End: 2021-09-08

## 2021-09-08 VITALS — BODY MASS INDEX: 21.36 KG/M2 | HEIGHT: 65 IN | HEART RATE: 68 BPM | WEIGHT: 128.2 LBS | OXYGEN SATURATION: 97 %

## 2021-09-08 DIAGNOSIS — M17.12 PRIMARY OSTEOARTHRITIS OF LEFT KNEE: ICD-10-CM

## 2021-09-08 DIAGNOSIS — M18.12 ARTHRITIS OF CARPOMETACARPAL (CMC) JOINT OF LEFT THUMB: Primary | ICD-10-CM

## 2021-09-08 DIAGNOSIS — M79.642 HAND PAIN, LEFT: ICD-10-CM

## 2021-09-08 PROCEDURE — 20600 DRAIN/INJ JOINT/BURSA W/O US: CPT | Performed by: ORTHOPAEDIC SURGERY

## 2021-09-08 PROCEDURE — 20610 DRAIN/INJ JOINT/BURSA W/O US: CPT | Performed by: ORTHOPAEDIC SURGERY

## 2021-09-08 PROCEDURE — 99213 OFFICE O/P EST LOW 20 MIN: CPT | Performed by: ORTHOPAEDIC SURGERY

## 2021-09-08 RX ORDER — METHYLPREDNISOLONE ACETATE 80 MG/ML
80 INJECTION, SUSPENSION INTRA-ARTICULAR; INTRALESIONAL; INTRAMUSCULAR; SOFT TISSUE
Status: COMPLETED | OUTPATIENT
Start: 2021-09-08 | End: 2021-09-08

## 2021-09-08 RX ORDER — LIDOCAINE HYDROCHLORIDE 10 MG/ML
1 INJECTION, SOLUTION INFILTRATION; PERINEURAL
Status: COMPLETED | OUTPATIENT
Start: 2021-09-08 | End: 2021-09-08

## 2021-09-08 RX ORDER — LIDOCAINE HYDROCHLORIDE 10 MG/ML
9 INJECTION, SOLUTION INFILTRATION; PERINEURAL
Status: COMPLETED | OUTPATIENT
Start: 2021-09-08 | End: 2021-09-08

## 2021-09-08 RX ADMIN — METHYLPREDNISOLONE ACETATE 80 MG: 80 INJECTION, SUSPENSION INTRA-ARTICULAR; INTRALESIONAL; INTRAMUSCULAR; SOFT TISSUE at 14:34

## 2021-09-08 RX ADMIN — LIDOCAINE HYDROCHLORIDE 9 ML: 10 INJECTION, SOLUTION INFILTRATION; PERINEURAL at 14:34

## 2021-09-08 RX ADMIN — LIDOCAINE HYDROCHLORIDE 1 ML: 10 INJECTION, SOLUTION INFILTRATION; PERINEURAL at 14:34

## 2021-09-08 NOTE — PROGRESS NOTES
"Chief Complaint  Initial Evaluation of the Left Hand     Subjective      Melissa Ruth presents to [facility] for an evaluation of left hand. She states she has weakness in her  and she can't hold items for long periods of time. She states she has some tingling in her pinky. She points to her thumb as her main source of pain. She denies any injury or trauma to her left hand.     Patient also complains of left knee pain. She has history of left knee arthroscopic surgery performed by Dr. Simmons in August 2019. She states she is starting having joint line pain. She states she is concerned she may have torn her meniscus again. She denies any trauma or injury to her left knee.     Allergies   Allergen Reactions   • Penicillins Itching     Positive on allergy skin test         Social History     Socioeconomic History   • Marital status:      Spouse name: Not on file   • Number of children: Not on file   • Years of education: Not on file   • Highest education level: Not on file   Tobacco Use   • Smoking status: Former Smoker   • Smokeless tobacco: Never Used   Vaping Use   • Vaping Use: Never used   Substance and Sexual Activity   • Alcohol use: Never   • Drug use: Never   • Sexual activity: Defer        Review of Systems     Objective   Vital Signs:   Pulse 68   Ht 165.1 cm (65\")   Wt 58.2 kg (128 lb 3.2 oz)   SpO2 97%   BMI 21.33 kg/m²       Physical Exam  Constitutional:       Appearance: Normal appearance. Patient is well-developed and normal weight.   HENT:      Head: Normocephalic.      Right Ear: Hearing and external ear normal.      Left Ear: Hearing and external ear normal.      Nose: Nose normal.   Eyes:      Conjunctiva/sclera: Conjunctivae normal.   Cardiovascular:      Rate and Rhythm: Normal rate.   Pulmonary:      Effort: Pulmonary effort is normal.      Breath sounds: No wheezing or rales.   Abdominal:      Palpations: Abdomen is soft.      Tenderness: " There is no abdominal tenderness.   Musculoskeletal:      Cervical back: Normal range of motion.   Skin:     Findings: No rash.   Neurological:      Mental Status: Patient  is alert and oriented to person, place, and time.   Psychiatric:         Mood and Affect: Mood and affect normal.         Judgment: Judgment normal.       Ortho Exam      LEFT KNEE: Palpable osteophyte. No skin discoloration or atrophy. Tenderness about the medial joint line. Non-tender lateral joint line. Stable to varus/valgus stress. Negative Lachman. Skin intact. No swelling. Good strength to hamstrings, quadriceps, dorsiflexors and plantar flexors. Sensation grossly intact. Neurovascular intact. Full extension. Flexion to 125 degrees. Full weight bearing. Non-antalgic gait.     LEFT WRIST: Tenderness about the base of the thumb. Negative Tinel's. Positive CMC grind test. Negative Finkelstein's. Neurovascular intact. Sensation grossly intact. No swelling, atrophy, and skin discoloration. Skin intact. Full ROM. Patient able to wiggle fingers and make a fist. Full wrist extension, full wrist flexion, full , full thumb opposition, full PIP flexors, full DIP flexors, full PIP extensors, full finger adduction, full finger abduction. Radial pulse 2+, ulnar pulse 2+.      Large Joint Arthrocentesis: L knee  Date/Time: 9/8/2021 2:34 PM  Consent given by: patient  Site marked: site marked  Timeout: Immediately prior to procedure a time out was called to verify the correct patient, procedure, equipment, support staff and site/side marked as required   Supporting Documentation  Indications: pain   Procedure Details  Location: knee - L knee  Needle size: 22 G  Medications administered: 80 mg methylPREDNISolone acetate 80 MG/ML; 9 mL lidocaine 1 %  Patient tolerance: patient tolerated the procedure well with no immediate complications    Small Joint Arthrocentesis: L thumb CMC  Consent given by: patient  Site marked: site marked  Timeout: Immediately  prior to procedure a time out was called to verify the correct patient, procedure, equipment, support staff and site/side marked as required   Procedure Details  Location: thumb - L thumb CMC  Preparation: Patient was prepped and draped in the usual sterile fashion  Needle gauge: 23G.  Medications administered: 80 mg methylPREDNISolone acetate 80 MG/ML; 1 mL lidocaine 1 %  Patient tolerance: patient tolerated the procedure well with no immediate complications              Imaging Results (Most Recent)     Procedure Component Value Units Date/Time    XR Knee 3 View Left [[id number]] Resulted: 09/08/21 1308     Updated: 09/08/21 1314    XR Hand 2 View Left [[id number]] Resulted: 09/08/21 1308     Updated: 09/08/21 1314           Result Review :       X-Ray Report:  Left knee(s) and Left wrist(s) X-Ray  Indication: Evaluation of left knee pain and left wrist pain   AP, Lateral and Standing view(s)  Findings: There are some degenerative changes at the base of the thumb. No fracture or dislocation is noted of the left hand. Good maintenance of the cartilage within the left knee. No acute fracture or dislocation. Mild to moderate degenerative changes of the left knee.   Prior studies available for comparison: yes       Assessment and Plan     DX: Left knee osteoarthritis, mild to moderate   Left first CMC osteoarthritis     Patient opted to for a left knee steroid injection and left wrist steroid injection. She was given a left knee and left CMC injection, she tolerated both these procedures well. We discussed PRP injections and patient shows interest in this. If she wishes to try this, she will call us back.       Call or return if worsening symptoms.    Follow Up     PRN.       Patient was given instructions and counseling regarding her condition or for health maintenance advice. Please see specific information pulled into the AVS if appropriate.     Scribed for Emery Sanford MD by Rosa Osman.  09/08/21   13:18  EDT    I have personally performed the services described in this document as scribed by the above individual and it is both accurate and complete. Emery Sanford MD 09/08/21

## 2021-10-12 ENCOUNTER — TRANSCRIBE ORDERS (OUTPATIENT)
Dept: ADMINISTRATIVE | Facility: HOSPITAL | Age: 51
End: 2021-10-12

## 2021-10-12 DIAGNOSIS — Z12.31 SCREENING MAMMOGRAM FOR BREAST CANCER: Primary | ICD-10-CM

## 2021-12-23 ENCOUNTER — LAB (OUTPATIENT)
Dept: LAB | Facility: HOSPITAL | Age: 51
End: 2021-12-23

## 2021-12-23 ENCOUNTER — OFFICE VISIT (OUTPATIENT)
Dept: FAMILY MEDICINE CLINIC | Facility: CLINIC | Age: 51
End: 2021-12-23

## 2021-12-23 VITALS
WEIGHT: 128 LBS | OXYGEN SATURATION: 97 % | DIASTOLIC BLOOD PRESSURE: 70 MMHG | BODY MASS INDEX: 21.33 KG/M2 | HEART RATE: 98 BPM | SYSTOLIC BLOOD PRESSURE: 114 MMHG | HEIGHT: 65 IN

## 2021-12-23 DIAGNOSIS — M54.2 NECK PAIN: ICD-10-CM

## 2021-12-23 DIAGNOSIS — Z20.822 EXPOSURE TO COVID-19 VIRUS: ICD-10-CM

## 2021-12-23 DIAGNOSIS — E55.9 VITAMIN D DEFICIENCY: Primary | ICD-10-CM

## 2021-12-23 DIAGNOSIS — E55.9 VITAMIN D DEFICIENCY: ICD-10-CM

## 2021-12-23 LAB — 25(OH)D3 SERPL-MCNC: 54.2 NG/ML (ref 30–100)

## 2021-12-23 PROCEDURE — 36415 COLL VENOUS BLD VENIPUNCTURE: CPT

## 2021-12-23 PROCEDURE — 99214 OFFICE O/P EST MOD 30 MIN: CPT | Performed by: NURSE PRACTITIONER

## 2021-12-23 PROCEDURE — 82306 VITAMIN D 25 HYDROXY: CPT

## 2021-12-23 PROCEDURE — 86769 SARS-COV-2 COVID-19 ANTIBODY: CPT

## 2021-12-23 NOTE — PROGRESS NOTES
Chief Complaint  Vitamin D deficiency, neck pain  Subjective          Melissa Ruth presents to [facility] for   History of Present Illness    Patient presents today with complaints of neck Pain-states that she had developed what felt like a knot on back of neck that radiated down her back. She has went to massage therapist and this has helped significantly, states pain has pretty much resolved, not not is no longer palpable.  Patient has history of Vitamin D Deficiency (Patient taking vitamin d-3 and is needing a refill. ), and Labs (Patient would like labs ordered for covid antibodies)   Medical History  Past Medical History:   Diagnosis Date   • Alcoholism (HCC)    • Allergies    • Anxiety    • Arthritis    • Bursitis 09/05/2017    PRE PATELLA LEFT   • Chemical dependency (HCC)    • Condition not found     ULCER   • Depression    • Gastrocnemius strain, left 09/05/2017   • Hypothyroidism 08/18/2015   • Night sweats    • Seasonal allergies    • Thyroid disorder      Surgical History  Past Surgical History:   Procedure Laterality Date   • COLONOSCOPY     • KNEE SURGERY  1997   • OTHER SURGICAL HISTORY      METAL IMPLANT   • TONSILLECTOMY     • WRIST SURGERY  2014     Social History  Social History     Socioeconomic History   • Marital status:    Tobacco Use   • Smoking status: Former Smoker   • Smokeless tobacco: Never Used   Vaping Use   • Vaping Use: Never used   Substance and Sexual Activity   • Alcohol use: Never   • Drug use: Never   • Sexual activity: Defer       Current Outpatient Medications:   •  cetirizine (zyrTEC) 10 MG tablet, Take 10 mg by mouth Daily., Disp: , Rfl:   •  levothyroxine sodium (Tirosint) 88 MCG capsule, Take 1 capsule by mouth Daily., Disp: 90 capsule, Rfl: 1  •  valACYclovir (Valtrex) 1000 MG tablet, Take 2 tablets by mouth 2 (Two) Times a Day., Disp: 4 tablet, Rfl: 2  •  vitamin D3 125 MCG (5000 UT) capsule capsule, Take 1 capsule by mouth  "Daily., Disp: 30 capsule, Rfl: 1    Review of Systems     Objective     /70   Pulse 98   Ht 165.1 cm (65\")   Wt 58.1 kg (128 lb)   SpO2 97%   BMI 21.30 kg/m²     Body mass index is 21.3 kg/m².    Physical Exam  Vitals reviewed.   Constitutional:       Appearance: Normal appearance. She is well-developed.   HENT:      Head: Normocephalic and atraumatic.      Right Ear: External ear normal.      Left Ear: External ear normal.      Mouth/Throat:      Pharynx: No oropharyngeal exudate.   Eyes:      Conjunctiva/sclera: Conjunctivae normal.      Pupils: Pupils are equal, round, and reactive to light.   Cardiovascular:      Rate and Rhythm: Normal rate and regular rhythm.      Heart sounds: No murmur heard.  No friction rub. No gallop.    Pulmonary:      Effort: Pulmonary effort is normal.      Breath sounds: Normal breath sounds. No wheezing or rhonchi.   Musculoskeletal:      Cervical back: Normal range of motion and neck supple. No rigidity or tenderness.   Lymphadenopathy:      Cervical: No cervical adenopathy.   Skin:     General: Skin is warm and dry.   Neurological:      Mental Status: She is alert and oriented to person, place, and time.      Cranial Nerves: No cranial nerve deficit.   Psychiatric:         Mood and Affect: Mood and affect normal.         Behavior: Behavior normal.         Thought Content: Thought content normal.         Judgment: Judgment normal.         Result Review :     The following data was reviewed by: SONNY Coe on 12/23/2021:                         Assessment:  Diagnoses and all orders for this visit:    1. Vitamin D deficiency (Primary)  Comments:  Labs ordered today, refill sent  Orders:  -     Vitamin D 25 hydroxy; Future  -     vitamin D3 125 MCG (5000 UT) capsule capsule; Take 1 capsule by mouth Daily.  Dispense: 30 capsule; Refill: 1    2. Exposure to COVID-19 virus  -     SARS-CoV-2 Antibodies (Roche); Future    3. Neck pain  Comments:  Neck pain has " resolved, patient may follow-up if any changes.              Follow Up     Return if symptoms worsen or fail to improve.    Patient was given instructions and counseling regarding her condition or for health maintenance advice. Please see specific information pulled into the AVS if appropriate.     Chata De Souza, APRN  12/23/2021

## 2021-12-24 LAB — SARS-COV-2 AB SERPL QL IA: POSITIVE

## 2021-12-28 ENCOUNTER — TELEPHONE (OUTPATIENT)
Dept: FAMILY MEDICINE CLINIC | Facility: CLINIC | Age: 51
End: 2021-12-28

## 2021-12-28 NOTE — TELEPHONE ENCOUNTER
----- Message from SONNY Fernandez sent at 12/28/2021  7:25 AM EST -----  Antibodies positive   Advancement Flap (Single) Text: The defect edges were debeveled with a #15 scalpel blade.  Given the location of the defect and the proximity to free margins a single advancement flap was deemed most appropriate.  Using a sterile surgical marker, an appropriate advancement flap was drawn incorporating the defect and placing the expected incisions within the relaxed skin tension lines where possible.    The area thus outlined was incised deep to adipose tissue with a #15 scalpel blade.  The skin margins were undermined to an appropriate distance in all directions utilizing iris scissors.

## 2021-12-30 ENCOUNTER — OFFICE VISIT (OUTPATIENT)
Dept: FAMILY MEDICINE CLINIC | Facility: CLINIC | Age: 51
End: 2021-12-30

## 2021-12-30 VITALS
OXYGEN SATURATION: 100 % | WEIGHT: 133 LBS | BODY MASS INDEX: 22.16 KG/M2 | DIASTOLIC BLOOD PRESSURE: 70 MMHG | SYSTOLIC BLOOD PRESSURE: 129 MMHG | HEIGHT: 65 IN | HEART RATE: 60 BPM

## 2021-12-30 DIAGNOSIS — H65.93 BILATERAL SEROUS OTITIS MEDIA, UNSPECIFIED CHRONICITY: ICD-10-CM

## 2021-12-30 DIAGNOSIS — R09.81 NASAL CONGESTION: Primary | ICD-10-CM

## 2021-12-30 PROCEDURE — 99213 OFFICE O/P EST LOW 20 MIN: CPT | Performed by: NURSE PRACTITIONER

## 2021-12-30 PROCEDURE — 96372 THER/PROPH/DIAG INJ SC/IM: CPT | Performed by: NURSE PRACTITIONER

## 2021-12-30 RX ORDER — CHLORCYCLIZINE HYDROCHLORIDE AND PSEUDOEPHEDRINE HYDROCHLORIDE 25; 60 MG/1; MG/1
TABLET ORAL
Qty: 42 TABLET | Refills: 0 | Status: SHIPPED | OUTPATIENT
Start: 2021-12-30 | End: 2022-03-11

## 2021-12-30 RX ORDER — TRIAMCINOLONE ACETONIDE 40 MG/ML
40 INJECTION, SUSPENSION INTRA-ARTICULAR; INTRAMUSCULAR ONCE
Status: COMPLETED | OUTPATIENT
Start: 2021-12-30 | End: 2021-12-30

## 2021-12-30 RX ADMIN — TRIAMCINOLONE ACETONIDE 40 MG: 40 INJECTION, SUSPENSION INTRA-ARTICULAR; INTRAMUSCULAR at 14:05

## 2021-12-30 NOTE — PROGRESS NOTES
Chief Complaint  Nasal Congestion, Headache, and Ear Drainage (fluid on ears)    Subjective          Melissa Ruth presents to [facility] for   History of Present Illness  Patient presents today with complaints of continued nasal congestion, mild sinus headache, and feeling like she still has fluid on her ears.  States that she had fluid on her ear for the last office visit and that it does not seem to have improved any.  Denies any fever, states that all of her drainage is clear,    Medical History  Past Medical History:   Diagnosis Date   • Alcoholism (HCC)    • Allergies    • Anxiety    • Arthritis    • Bursitis 09/05/2017    PRE PATELLA LEFT   • Chemical dependency (HCC)    • Condition not found     ULCER   • Depression    • Gastrocnemius strain, left 09/05/2017   • Hypothyroidism 08/18/2015   • Night sweats    • Seasonal allergies    • Thyroid disorder      Surgical History  Past Surgical History:   Procedure Laterality Date   • COLONOSCOPY     • KNEE SURGERY  1997   • OTHER SURGICAL HISTORY      METAL IMPLANT   • TONSILLECTOMY     • WRIST SURGERY  2014     Social History  Social History     Socioeconomic History   • Marital status:    Tobacco Use   • Smoking status: Former Smoker   • Smokeless tobacco: Never Used   Vaping Use   • Vaping Use: Never used   Substance and Sexual Activity   • Alcohol use: Never   • Drug use: Never   • Sexual activity: Defer       Current Outpatient Medications:   •  cetirizine (zyrTEC) 10 MG tablet, Take 10 mg by mouth Daily., Disp: , Rfl:   •  levothyroxine sodium (Tirosint) 88 MCG capsule, Take 1 capsule by mouth Daily., Disp: 90 capsule, Rfl: 1  •  valACYclovir (Valtrex) 1000 MG tablet, Take 2 tablets by mouth 2 (Two) Times a Day., Disp: 4 tablet, Rfl: 2  •  vitamin D3 125 MCG (5000 UT) capsule capsule, Take 1 capsule by mouth Daily., Disp: 30 capsule, Rfl: 1  •  Chlorcyclizine-Pseudoephed (Stahist AD) 25-60 MG tablet, 1/2 to 1 tab PO q  "8hr PRN, Disp: 42 tablet, Rfl: 0  No current facility-administered medications for this visit.    Review of Systems     Objective     /70   Pulse 60   Ht 165.1 cm (65\")   Wt 60.3 kg (133 lb)   SpO2 100%   BMI 22.13 kg/m²     Body mass index is 22.13 kg/m².    Physical Exam  Vitals reviewed.   Constitutional:       Appearance: Normal appearance. She is well-developed.   HENT:      Head: Normocephalic and atraumatic.      Right Ear: Ear canal and external ear normal.      Left Ear: Ear canal and external ear normal.      Ears:      Comments: Small amount of serous fluid bilateral     Nose: Congestion and rhinorrhea present.      Mouth/Throat:      Pharynx: No oropharyngeal exudate or posterior oropharyngeal erythema.   Eyes:      Conjunctiva/sclera: Conjunctivae normal.      Pupils: Pupils are equal, round, and reactive to light.   Cardiovascular:      Rate and Rhythm: Normal rate and regular rhythm.      Heart sounds: No murmur heard.  No friction rub. No gallop.    Pulmonary:      Effort: Pulmonary effort is normal.      Breath sounds: Normal breath sounds. No wheezing or rhonchi.   Skin:     General: Skin is warm and dry.   Neurological:      Mental Status: She is alert and oriented to person, place, and time.      Cranial Nerves: No cranial nerve deficit.   Psychiatric:         Mood and Affect: Mood and affect normal.         Behavior: Behavior normal.         Thought Content: Thought content normal.         Judgment: Judgment normal.         Result Review :{Labs  Result Review  Imaging  Med Tab  Media :23}     The following data was reviewed by: SONNY Coe on 12/30/2021:                         Assessment:  Diagnoses and all orders for this visit:    1. Nasal congestion (Primary)  -     triamcinolone acetonide (KENALOG-40) injection 40 mg    2. Bilateral serous otitis media, unspecified chronicity  -     triamcinolone acetonide (KENALOG-40) injection 40 mg  -     " Chlorcyclizine-Pseudoephed (Stahist AD) 25-60 MG tablet; 1/2 to 1 tab PO q 8hr PRN  Dispense: 42 tablet; Refill: 0              Follow Up     Return if symptoms worsen or fail to improve.    Patient was given instructions and counseling regarding her condition or for health maintenance advice. Please see specific information pulled into the AVS if appropriate.     Chata De Souza, APRN  12/30/2021

## 2022-01-11 ENCOUNTER — HOSPITAL ENCOUNTER (OUTPATIENT)
Dept: MAMMOGRAPHY | Facility: HOSPITAL | Age: 52
Discharge: HOME | End: 2022-01-11
Admitting: OBSTETRICS & GYNECOLOGY

## 2022-01-11 DIAGNOSIS — Z12.31 SCREENING MAMMOGRAM FOR BREAST CANCER: ICD-10-CM

## 2022-01-11 PROCEDURE — 77063 BREAST TOMOSYNTHESIS BI: CPT

## 2022-01-11 PROCEDURE — 77067 SCR MAMMO BI INCL CAD: CPT

## 2022-02-22 NOTE — TELEPHONE ENCOUNTER
Caller: Melissa Ruth    Relationship: Self    Best call back number: [phone]    Requested Prescriptions:   Requested Prescriptions     Pending Prescriptions Disp Refills   • levothyroxine sodium (Tirosint) 88 MCG capsule 90 capsule 1     Sig: Take 1 capsule by mouth Daily.        Pharmacy where request should be sent: [address], SUITE [facility] - [phone]  - [phone] FX     Additional details provided by patient:     Does the patient have less than a 3 day supply:  [] Yes  [x] No    Go Perez Rep   02/22/22 11:34 EST

## 2022-02-23 RX ORDER — LEVOTHYROXINE SODIUM 88 UG/1
88 CAPSULE ORAL DAILY
Qty: 90 CAPSULE | Refills: 0 | Status: SHIPPED | OUTPATIENT
Start: 2022-02-23 | End: 2022-02-28 | Stop reason: SDUPTHER

## 2022-02-28 ENCOUNTER — OFFICE VISIT (OUTPATIENT)
Dept: SURGERY | Facility: CLINIC | Age: 52
End: 2022-02-28

## 2022-02-28 VITALS — RESPIRATION RATE: 18 BRPM | BODY MASS INDEX: 21.49 KG/M2 | HEART RATE: 76 BPM | WEIGHT: 129 LBS | HEIGHT: 65 IN

## 2022-02-28 DIAGNOSIS — R19.7 DIARRHEA, UNSPECIFIED TYPE: ICD-10-CM

## 2022-02-28 DIAGNOSIS — R10.13 EPIGASTRIC PAIN: Primary | ICD-10-CM

## 2022-02-28 DIAGNOSIS — E03.9 ACQUIRED HYPOTHYROIDISM: Primary | ICD-10-CM

## 2022-02-28 PROCEDURE — 99213 OFFICE O/P EST LOW 20 MIN: CPT | Performed by: NURSE PRACTITIONER

## 2022-02-28 RX ORDER — LEVOTHYROXINE SODIUM 88 UG/1
88 CAPSULE ORAL DAILY
Qty: 90 CAPSULE | Refills: 0 | OUTPATIENT
Start: 2022-02-28

## 2022-02-28 RX ORDER — LEVOTHYROXINE SODIUM 88 UG/1
88 TABLET ORAL DAILY
Qty: 90 TABLET | Refills: 0 | Status: SHIPPED | OUTPATIENT
Start: 2022-02-28 | End: 2022-05-11

## 2022-02-28 RX ORDER — FLUCONAZOLE 100 MG/1
100 TABLET ORAL DAILY
Qty: 3 TABLET | Refills: 0 | Status: SHIPPED | OUTPATIENT
Start: 2022-02-28 | End: 2022-03-03

## 2022-03-01 NOTE — PROGRESS NOTES
Chief Complaint: Abdominal Pain, Nausea, and Heartburn    Subjective      I am with nausea and abdominal pain        History of Present Illness  Melissa Ruth is a 52 y.o. female presents to [facility] for nausea and abdominal pain.    Patient presents today with complaints of epigastric and right upper quadrant pain associated with some nausea.    Patient reports that she has associated eating with her pain and nausea.    Denies any vomiting.    Patient describes her pain as attacks.    On the last 2 previous EGDs patient was with Candida infection.    Denies any fever or chills.    3/20: EGD (Nathaniel): Tamiko esophagitis; normal colon    Objective     Past Medical History:   Diagnosis Date   • Alcoholism (HCC)    • Allergies    • Anxiety    • Arthritis    • Bursitis 09/05/2017    PRE PATELLA LEFT   • Chemical dependency (HCC)    • Condition not found     ULCER   • Depression    • Gastrocnemius strain, left 09/05/2017   • Hypothyroidism 08/18/2015   • Night sweats    • Seasonal allergies    • Thyroid disorder        Past Surgical History:   Procedure Laterality Date   • COLONOSCOPY     • KNEE SURGERY  1997   • OTHER SURGICAL HISTORY      METAL IMPLANT   • TONSILLECTOMY     • WRIST SURGERY  2014       Outpatient Medications Marked as Taking for the 2/28/22 encounter (Office Visit) with Heber, April, APRN   Medication Sig Dispense Refill   • cetirizine (zyrTEC) 10 MG tablet Take 10 mg by mouth Daily.     • vitamin D3 125 MCG (5000 UT) capsule capsule Take 1 capsule by mouth Daily. 30 capsule 1   • [DISCONTINUED] levothyroxine sodium (Tirosint) 88 MCG capsule Take 1 capsule by mouth Daily. 90 capsule 0       Allergies   Allergen Reactions   • Penicillins Itching     Positive on allergy skin test         Family History   Problem Relation Age of Onset   • Arthritis Mother    • Stroke Father    • Arthritis Father    • Lung cancer Other         UNCLE   • Other Other         FAMILY  "HISTORY OF CERTAIN CHRONIC DISABLING DISEASES       Social History     Socioeconomic History   • Marital status:    Tobacco Use   • Smoking status: Former Smoker   • Smokeless tobacco: Never Used   Vaping Use   • Vaping Use: Never used   Substance and Sexual Activity   • Alcohol use: Never   • Drug use: Never   • Sexual activity: Defer       Review of Systems   Constitutional: Negative for chills and fever.   Gastrointestinal: Positive for abdominal pain and nausea. Negative for abdominal distention, anal bleeding, blood in stool, constipation, diarrhea, rectal pain, vomiting, GERD and indigestion.        Vital Signs:   Pulse 76   Resp 18   Ht 165.1 cm (65\")   Wt 58.5 kg (129 lb)   BMI 21.47 kg/m²      Physical Exam  Constitutional:       Appearance: Normal appearance.   HENT:      Head: Normocephalic.   Cardiovascular:      Rate and Rhythm: Normal rate.   Pulmonary:      Effort: Pulmonary effort is normal.   Abdominal:      General: Abdomen is flat.      Palpations: Abdomen is soft.      Tenderness: There is abdominal tenderness.   Skin:     General: Skin is warm and dry.   Neurological:      General: No focal deficit present.      Mental Status: She is alert and oriented to person, place, and time.   Psychiatric:         Mood and Affect: Mood normal.         Thought Content: Thought content normal.          Result Review :          []  Laboratory  []  Radiology  [x]  Pathology  []  Microbiology  []  EKG/Telemetry   []  Cardiology/Vascular   [x]  Old records  Today I have reviewed previous EGDs and pathology's     Assessment and Plan    Diagnoses and all orders for this visit:    1. Epigastric pain (Primary)  -     US Gallbladder; Future    2. Diarrhea, unspecified type  -     US Gallbladder; Future    Other orders  -     fluconazole (Diflucan) 100 MG tablet; Take 1 tablet by mouth Daily for 3 days.  Dispense: 3 tablet; Refill: 0  -     nystatin (MYCOSTATIN) 100,000 unit/mL suspension; Swish and swallow " 5 mL 3 (Three) Times a Day.  Dispense: 750 mL; Refill: 0        Follow Up   Return for Gallbladder ultrasound; follow-up with me post imaging.     I have educated the patient on when to notify the office or to seek medical emergency services, fever greater than 101 associated with chills.  Or unable to tolerate diet.    Patient was given instructions and counseling regarding her condition or for health maintenance advice. Please see specific information pulled into the AVS if appropriate.

## 2022-03-10 PROBLEM — N95.1 MENOPAUSAL SYMPTOM: Status: ACTIVE | Noted: 2022-03-10

## 2022-03-10 PROBLEM — G93.32 CHRONIC FATIGUE SYNDROME: Status: ACTIVE | Noted: 2022-03-10

## 2022-03-10 PROBLEM — G47.9 SLEEP DISTURBANCE: Status: ACTIVE | Noted: 2022-03-10

## 2022-03-10 PROBLEM — E55.9 VITAMIN D DEFICIENCY: Status: ACTIVE | Noted: 2022-03-10

## 2022-03-11 ENCOUNTER — OFFICE VISIT (OUTPATIENT)
Dept: FAMILY MEDICINE CLINIC | Facility: CLINIC | Age: 52
End: 2022-03-11

## 2022-03-11 ENCOUNTER — LAB (OUTPATIENT)
Dept: LAB | Facility: HOSPITAL | Age: 52
End: 2022-03-11

## 2022-03-11 VITALS
SYSTOLIC BLOOD PRESSURE: 109 MMHG | BODY MASS INDEX: 21.63 KG/M2 | WEIGHT: 129.8 LBS | HEART RATE: 69 BPM | OXYGEN SATURATION: 99 % | HEIGHT: 65 IN | TEMPERATURE: 97.5 F | DIASTOLIC BLOOD PRESSURE: 79 MMHG

## 2022-03-11 DIAGNOSIS — Z13.220 NEED FOR LIPID SCREENING: ICD-10-CM

## 2022-03-11 DIAGNOSIS — E03.9 ACQUIRED HYPOTHYROIDISM: Primary | Chronic | ICD-10-CM

## 2022-03-11 DIAGNOSIS — R53.83 OTHER FATIGUE: ICD-10-CM

## 2022-03-11 DIAGNOSIS — R23.2 HOT FLASHES: ICD-10-CM

## 2022-03-11 DIAGNOSIS — E03.9 ACQUIRED HYPOTHYROIDISM: Chronic | ICD-10-CM

## 2022-03-11 LAB
T4 FREE SERPL-MCNC: 1.35 NG/DL (ref 0.93–1.7)
TSH SERPL DL<=0.05 MIU/L-ACNC: 1.76 UIU/ML (ref 0.27–4.2)

## 2022-03-11 PROCEDURE — 36415 COLL VENOUS BLD VENIPUNCTURE: CPT

## 2022-03-11 PROCEDURE — 99214 OFFICE O/P EST MOD 30 MIN: CPT | Performed by: PHYSICIAN ASSISTANT

## 2022-03-11 PROCEDURE — 84443 ASSAY THYROID STIM HORMONE: CPT

## 2022-03-11 PROCEDURE — 84439 ASSAY OF FREE THYROXINE: CPT

## 2022-03-11 NOTE — PROGRESS NOTES
Chief Complaint  Chief Complaint   Patient presents with   • Hypothyroidism     6 month follow up       Subjective          Melissa Ruth presents to [facility]  History of Present Illness     6 month follow up    Hypothyroidism: Patient is currently on Tirosint and doing well. She has not started the new prescription, levothyroxine. Patient states she would like to continue using Tirosint until she has to switch to using levothyroxine (just a few more days). Patient states energy is good only fatigued during menses. Patient denies weight changes, bowel changes and changes in hair, skin and nails. Patient's insurance declined Tirosint; patient was paying out of pocket. Has been stable on current dose for about 3 years. Initially when diagnosed more than 10 years ago, patient was tried on levothyroxine without stable results.    Patient is having hot flashes; still on menses (on currently) and has fatigue when on cycle. Cycles have been a bit irregular and heavier flow.    Labs: 08/23/21  Covid: vacccinated  Flu: refuses  Pap: 12/8/20  Colon: 11/30/20    Medical History: has a past medical history of Alcoholism ([facility]), Allergies, Anxiety, Arthritis, Bursitis (09/05/2017), Chemical dependency ([facility]), Condition not found, Depression, Gastrocnemius strain, left (09/05/2017), Hypothyroidism (08/18/2015), Night sweats, Seasonal allergies, and Thyroid disorder.   Surgical History: has a past surgical history that includes Colonoscopy; Other surgical history; Knee surgery (1997); Tonsillectomy; and Wrist surgery (2014).   Family History: family history includes Arthritis in her father and mother; Lung cancer in an other family member; Other in an other family member; Stroke in her father.   Social History: reports that she has quit smoking. She has never used smokeless tobacco. She reports that she does not drink alcohol and does not use drugs.    Allergies: Penicillins    Health  "Maintenance Due   Topic Date Due   • ANNUAL PHYSICAL  Never done   • Pneumococcal Vaccine 0-64 (1 of 2 - PPSV23) Never done   • TDAP/TD VACCINES (1 - Tdap) Never done   • ZOSTER VACCINE (1 of 2) Never done       Immunization History   Administered Date(s) Administered   • COVID-19 (PFIZER) PURPLE CAP 04/08/2021, 04/29/2021   • Flu Vaccine Quad PF >18YRS 11/30/2020   • Hepatitis A 04/24/2018   • Influenza, Unspecified 11/30/2020       Objective     Vitals:    03/11/22 1044   BP: 109/79   BP Location: Right arm   Patient Position: Sitting   Pulse: 69   Temp: 97.5 °F (36.4 °C)   SpO2: 99%   Weight: 58.9 kg (129 lb 12.8 oz)   Height: 165.1 cm (65\")     Body mass index is 21.6 kg/m².       Physical Exam  Vitals and nursing note reviewed.   Constitutional:       Appearance: Normal appearance.   HENT:      Head: Normocephalic and atraumatic.   Neck:      Vascular: No carotid bruit.      Comments: Thyroid : gland size normal, nontender, no nodules or masses present on palpation   Cardiovascular:      Rate and Rhythm: Normal rate and regular rhythm.      Pulses: Normal pulses.      Heart sounds: Normal heart sounds.   Pulmonary:      Effort: Pulmonary effort is normal.      Breath sounds: Normal breath sounds.   Musculoskeletal:      Cervical back: Neck supple. No tenderness.      Right lower leg: No edema.      Left lower leg: No edema.   Lymphadenopathy:      Cervical: No cervical adenopathy.   Neurological:      Mental Status: She is alert.   Psychiatric:         Mood and Affect: Mood normal.         Behavior: Behavior normal.           Result Review :                           Assessment and Plan      Diagnoses and all orders for this visit:    1. Acquired hypothyroidism (Primary)  Comments:  Stable on current dose of Tirosint 88mcg daily; will be changing to levothyroxine 88mcg (already has script). Check labs in 10 weeks after starting new med.  Orders:  -     TSH+Free T4; Future  -     TSH; Future  -     T4, Free; " Future    2. Hot flashes  Comments:  New problem: check labs  Orders:  -     Estrogens, Total; Future  -     Follicle Stimulating Hormone; Future  -     Luteinizing Hormone; Future  -     Progesterone; Future  -     Testosterone, Total, Women, Children, and Hypogonadal Males; Future    3. Other fatigue  Comments:  New problem: check labs.  Orders:  -     Comprehensive Metabolic Panel; Future  -     CBC & Differential; Future  -     Iron Profile; Future    4. Need for lipid screening  -     Lipid Panel; Future            Follow Up     Return in about 6 months (around 9/11/2022).    Patient was given instructions and counseling regarding her condition or for health maintenance advice. Please see specific information pulled into the AVS if appropriate.

## 2022-03-21 ENCOUNTER — TELEPHONE (OUTPATIENT)
Dept: FAMILY MEDICINE CLINIC | Facility: CLINIC | Age: 52
End: 2022-03-21

## 2022-03-21 DIAGNOSIS — R10.9 ABDOMINAL PAIN, UNSPECIFIED ABDOMINAL LOCATION: Primary | ICD-10-CM

## 2022-03-21 DIAGNOSIS — R11.0 NAUSEA: ICD-10-CM

## 2022-03-21 NOTE — TELEPHONE ENCOUNTER
Caller: Melissa Ruth    Relationship: Self    Best call back number: [phone]    What is the medical concern/diagnosis: GALLBLADDER ISSUES    What specialty or service is being requested: GASTROLOGY    What is the provider, practice or medical service name:     Any additional details: PATIENT WAS SEEING  AT [facility] AND WAS TOLD SHE NEEDS A GALLBLADDER TEST. BUT PATIENT STATES THAT THEY DIDN'T EVEN CHECK HER STOMACH SO SHE WOULD LIKE A 2ND OPINION BEFORE SHE DOES AN EXPENSIVE TEST. SHE PREFERS DR. CRAWFORD. PATIENT WANTS TO SCHEDULE HER OWN APPOINTMENT DUE TO LIMITED AVAILABILITY. PLEASE CALL WHEN REFERRAL IS PLACED

## 2022-04-06 ENCOUNTER — APPOINTMENT (OUTPATIENT)
Dept: ULTRASOUND IMAGING | Facility: HOSPITAL | Age: 52
End: 2022-04-06

## 2022-04-13 ENCOUNTER — TELEPHONE (OUTPATIENT)
Dept: SURGERY | Facility: CLINIC | Age: 52
End: 2022-04-13

## 2022-04-13 ENCOUNTER — TELEPHONE (OUTPATIENT)
Dept: FAMILY MEDICINE CLINIC | Facility: CLINIC | Age: 52
End: 2022-04-13

## 2022-04-13 NOTE — TELEPHONE ENCOUNTER
Patient switched from Tirosint to levothyroxine which could be causing symptoms. I ordered a plethora of labs on her at her last visit for May which includes a TSH/T4. She *should* be able to go to the lab (fasting) to get all of her lab work completed.

## 2022-04-13 NOTE — TELEPHONE ENCOUNTER
Caller: Melissa Ruth    Relationship: Self    Best call back number: [phone]     What is the best time to reach you: ANY    Who are you requesting to speak with (clinical staff, provider,  specific staff member): CLINICAL STAFF      What was the call regarding: PATIENT REPORTS SHE HAS BEEN ON levothyroxine (Synthroid) 88 MCG tablet FOR ABOUT 5 WEEKS, STATES SHE STILLS FEELS TIRED, LOW ENERGY - BUT NOT SURE IF ITS THE MEDS OR THE WEATHER.  PATIENT WOULD LIKE TO KNOW IF SHE SHOULD GET HER THYROID LEVELS CHECKED - PLEASE ADVISE    Do you require a callback: YES

## 2022-04-14 NOTE — TELEPHONE ENCOUNTER
2ND CALL TO PT TO RS APPT FROM 4/3/22 W/ April, PT ALSO NEEDS TO RS GALLBLADDER US. NO ANSWER/LMOM

## 2022-04-15 NOTE — TELEPHONE ENCOUNTER
3RD CALL TO PT TO RS APPT FROM 4/3/22 W/ April, PT ALSO NEEDS TO RS GALLBLADDER US. NO ANSWER/LMOM. PLEASE ADVISE IF ANYTHING FURTHER TO DO

## 2022-05-05 ENCOUNTER — LAB (OUTPATIENT)
Dept: LAB | Facility: HOSPITAL | Age: 52
End: 2022-05-05

## 2022-05-05 DIAGNOSIS — R53.83 OTHER FATIGUE: ICD-10-CM

## 2022-05-05 DIAGNOSIS — R23.2 HOT FLASHES: ICD-10-CM

## 2022-05-05 DIAGNOSIS — E03.9 ACQUIRED HYPOTHYROIDISM: Chronic | ICD-10-CM

## 2022-05-05 DIAGNOSIS — Z13.220 NEED FOR LIPID SCREENING: ICD-10-CM

## 2022-05-05 LAB
ALBUMIN SERPL-MCNC: 4.1 G/DL (ref 3.5–5.2)
ALBUMIN/GLOB SERPL: 1.6 G/DL
ALP SERPL-CCNC: 63 U/L (ref 39–117)
ALT SERPL W P-5'-P-CCNC: 20 U/L (ref 1–33)
ANION GAP SERPL CALCULATED.3IONS-SCNC: 10.5 MMOL/L (ref 5–15)
AST SERPL-CCNC: 23 U/L (ref 1–32)
BASOPHILS # BLD AUTO: 0.04 10*3/MM3 (ref 0–0.2)
BASOPHILS NFR BLD AUTO: 0.7 % (ref 0–1.5)
BILIRUB SERPL-MCNC: 0.5 MG/DL (ref 0–1.2)
BUN SERPL-MCNC: 11 MG/DL (ref 6–20)
BUN/CREAT SERPL: 12.4 (ref 7–25)
CALCIUM SPEC-SCNC: 9.5 MG/DL (ref 8.6–10.5)
CHLORIDE SERPL-SCNC: 103 MMOL/L (ref 98–107)
CHOLEST SERPL-MCNC: 199 MG/DL (ref 0–200)
CO2 SERPL-SCNC: 23.5 MMOL/L (ref 22–29)
CREAT SERPL-MCNC: 0.89 MG/DL (ref 0.57–1)
DEPRECATED RDW RBC AUTO: 40.2 FL (ref 37–54)
EGFRCR SERPLBLD CKD-EPI 2021: 78.1 ML/MIN/1.73
EOSINOPHIL # BLD AUTO: 0.07 10*3/MM3 (ref 0–0.4)
EOSINOPHIL NFR BLD AUTO: 1.2 % (ref 0.3–6.2)
ERYTHROCYTE [DISTWIDTH] IN BLOOD BY AUTOMATED COUNT: 12.4 % (ref 12.3–15.4)
FSH SERPL-ACNC: 4.91 MIU/ML
GLOBULIN UR ELPH-MCNC: 2.5 GM/DL
GLUCOSE SERPL-MCNC: 86 MG/DL (ref 65–99)
HCT VFR BLD AUTO: 39.4 % (ref 34–46.6)
HDLC SERPL-MCNC: 82 MG/DL (ref 40–60)
HGB BLD-MCNC: 12.9 G/DL (ref 12–15.9)
IMM GRANULOCYTES # BLD AUTO: 0.02 10*3/MM3 (ref 0–0.05)
IMM GRANULOCYTES NFR BLD AUTO: 0.3 % (ref 0–0.5)
IRON 24H UR-MRATE: 141 MCG/DL (ref 37–145)
IRON SATN MFR SERPL: 27 % (ref 20–50)
LDLC SERPL CALC-MCNC: 96 MG/DL (ref 0–100)
LDLC/HDLC SERPL: 1.14 {RATIO}
LH SERPL-ACNC: 9.26 MIU/ML
LYMPHOCYTES # BLD AUTO: 1.68 10*3/MM3 (ref 0.7–3.1)
LYMPHOCYTES NFR BLD AUTO: 27.8 % (ref 19.6–45.3)
MCH RBC QN AUTO: 29.1 PG (ref 26.6–33)
MCHC RBC AUTO-ENTMCNC: 32.7 G/DL (ref 31.5–35.7)
MCV RBC AUTO: 88.9 FL (ref 79–97)
MONOCYTES # BLD AUTO: 0.44 10*3/MM3 (ref 0.1–0.9)
MONOCYTES NFR BLD AUTO: 7.3 % (ref 5–12)
NEUTROPHILS NFR BLD AUTO: 3.8 10*3/MM3 (ref 1.7–7)
NEUTROPHILS NFR BLD AUTO: 62.7 % (ref 42.7–76)
NRBC BLD AUTO-RTO: 0 /100 WBC (ref 0–0.2)
PLATELET # BLD AUTO: 264 10*3/MM3 (ref 140–450)
PMV BLD AUTO: 10.7 FL (ref 6–12)
POTASSIUM SERPL-SCNC: 4.8 MMOL/L (ref 3.5–5.2)
PROGEST SERPL-MCNC: 0.22 NG/ML
PROT SERPL-MCNC: 6.6 G/DL (ref 6–8.5)
RBC # BLD AUTO: 4.43 10*6/MM3 (ref 3.77–5.28)
SODIUM SERPL-SCNC: 137 MMOL/L (ref 136–145)
T4 FREE SERPL-MCNC: 1.55 NG/DL (ref 0.93–1.7)
TIBC SERPL-MCNC: 527 MCG/DL (ref 298–536)
TRANSFERRIN SERPL-MCNC: 354 MG/DL (ref 200–360)
TRIGL SERPL-MCNC: 119 MG/DL (ref 0–150)
TSH SERPL DL<=0.05 MIU/L-ACNC: 0.88 UIU/ML (ref 0.27–4.2)
VLDLC SERPL-MCNC: 21 MG/DL (ref 5–40)
WBC NRBC COR # BLD: 6.05 10*3/MM3 (ref 3.4–10.8)

## 2022-05-05 PROCEDURE — 83540 ASSAY OF IRON: CPT

## 2022-05-05 PROCEDURE — 82672 ASSAY OF ESTROGEN: CPT

## 2022-05-05 PROCEDURE — 84439 ASSAY OF FREE THYROXINE: CPT

## 2022-05-05 PROCEDURE — 84144 ASSAY OF PROGESTERONE: CPT

## 2022-05-05 PROCEDURE — 83002 ASSAY OF GONADOTROPIN (LH): CPT

## 2022-05-05 PROCEDURE — 80061 LIPID PANEL: CPT

## 2022-05-05 PROCEDURE — 83001 ASSAY OF GONADOTROPIN (FSH): CPT

## 2022-05-05 PROCEDURE — 84403 ASSAY OF TOTAL TESTOSTERONE: CPT

## 2022-05-05 PROCEDURE — 36415 COLL VENOUS BLD VENIPUNCTURE: CPT

## 2022-05-05 PROCEDURE — 84466 ASSAY OF TRANSFERRIN: CPT

## 2022-05-05 PROCEDURE — 80050 GENERAL HEALTH PANEL: CPT

## 2022-05-10 LAB — ESTROGEN SERPL-MCNC: 584 PG/ML

## 2022-05-11 ENCOUNTER — TELEPHONE (OUTPATIENT)
Dept: FAMILY MEDICINE CLINIC | Facility: CLINIC | Age: 52
End: 2022-05-11

## 2022-05-11 DIAGNOSIS — E03.9 ACQUIRED HYPOTHYROIDISM: Primary | ICD-10-CM

## 2022-05-11 RX ORDER — LEVOTHYROXINE SODIUM 88 UG/1
88 CAPSULE ORAL DAILY
Qty: 90 CAPSULE | Refills: 0 | Status: SHIPPED | OUTPATIENT
Start: 2022-05-11 | End: 2022-06-28 | Stop reason: SDUPTHER

## 2022-05-11 NOTE — TELEPHONE ENCOUNTER
Caller: Melissa Ruth    Relationship: Self    Best call back number: [phone]    Requested Prescriptions:   TIROSINT 88 MCG    Pharmacy where request should be sent:    [facility] - [address], [address] - [phone]  - [phone] FX  [phone]    Additional details provided by patient:   PATIENT  STATED THAT THE TIROSINT 88 MCG MEDICATION HELPS WITH HER THYROID AND THE OTHER ONES DOES NOT HELP AS MUCH. SHE CURRENTLY HAS A 30 DAY SUPPLY OF THIS MEDICATION AND INSURANCE IS NOT WILLING TO PAY FOR IT. SHE IS WANTING TO CONTINUE TAKING THIS, BUT SHE WOULD LIKE TO HAVE IT TO WHERE IT WOULD BE COVERED. SHE IS REQUESTING CALL REGARDING HER OPTIONS.     Does the patient have less than a 3 day supply:  [] Yes  [x] No    Go Tyson Rep   05/11/22 11:41 EDT

## 2022-05-14 LAB — TESTOST SERPL-MCNC: 30.1 NG/DL

## 2022-06-13 NOTE — PROGRESS NOTES
Chief Complaint        Abdominal pain, and nausea     History of Present Illness      Melissa Ruth is a 52 y.o. female who presents to [facility] as a new patient with a history of epigastric pain, nausea, reflux, and central abdominal bulging.  Patient reports that she was previously seen by general surgery related to central abdominal bulging that occurred when she coughs, epigastric pain and nausea.  They wanted to perform further testing of the patient's gallbladder and she wished to seek a second opinion.  Patient continues to have intermittent nausea that lasts for a few weeks and she reports using Tums and watching her diet to help improve her symptoms.  She reports a history of candidiasis on previous scope and gastric ulcers.  She reports abdominal pain in the epigastric area that is constant and dull.  Bowel movements are described as normal occurring daily.  Patient denies fever,  vomiting, weight loss, night sweats, melena, hematochezia, hematemesis.    Endoscopy: Review of the patient's most recent EGD and colonoscopy performed by Dr. Armstrong on 03/16/2020 esophageal candidiasis.  Treated with nystatin.    Labs performed 05/05/2022 see below.    No recent abdominal imaging.     Results       Result Review :   The following data was reviewed by: SONNY Salazar on 06/14/2022     CMP    CMP 8/23/21 5/5/22   Glucose 82 86   BUN 14 11   Creatinine 0.87 0.89   eGFR Non African Am 69    Sodium 137 137   Potassium 4.1 4.8   Chloride 104 103   Calcium 9.3 9.5   Albumin 4.60 4.10   Total Bilirubin 0.3 0.5   Alkaline Phosphatase 85 63   AST (SGOT) 32 23   ALT (SGPT) 29 20           CBC    CBC 8/23/21 5/5/22   WBC 7.41 6.05   RBC 4.41 4.43   Hemoglobin 12.6 12.9   Hematocrit 39.6 39.4   MCV 89.8 88.9   MCH 28.6 29.1   MCHC 31.8 32.7   RDW 12.7 12.4   Platelets 257 264                      Past Medical History       Past Medical History:   Diagnosis Date   • Alcoholism (HCC)  "   • Allergies    • Anxiety    • Arthritis    • Bursitis 09/05/2017    PRE PATELLA LEFT   • Chemical dependency (HCC)    • Condition not found     ULCER   • Depression    • Gastrocnemius strain, left 09/05/2017   • GERD (gastroesophageal reflux disease) 2010   • Hypothyroidism 08/18/2015   • Night sweats    • Seasonal allergies    • Thyroid disorder    • Ulcer 2010       Past Surgical History:   Procedure Laterality Date   • COLONOSCOPY     • KNEE SURGERY  1997   • OTHER SURGICAL HISTORY      METAL IMPLANT   • TONSILLECTOMY     • UPPER GASTROINTESTINAL ENDOSCOPY  2000’s   • WRIST SURGERY  2014         Current Outpatient Medications:   •  cetirizine (zyrTEC) 10 MG tablet, Take 10 mg by mouth Daily., Disp: , Rfl:   •  Tirosint 88 MCG capsule, Take 1 capsule by mouth Daily., Disp: 90 capsule, Rfl: 0  •  valACYclovir (Valtrex) 1000 MG tablet, Take 2 tablets by mouth 2 (Two) Times a Day., Disp: 4 tablet, Rfl: 2  •  vitamin D3 125 MCG (5000 UT) capsule capsule, Take 1 capsule by mouth Daily., Disp: 30 capsule, Rfl: 1  •  sucralfate (Carafate) 1 g tablet, Take 1 tablet by mouth 4 (Four) Times a Day., Disp: 120 tablet, Rfl: 3     Allergies   Allergen Reactions   • Penicillins Itching     Positive on allergy skin test        Family History   Problem Relation Age of Onset   • Arthritis Mother    • Stroke Father    • Arthritis Father    • Alcohol abuse Father    • Lung cancer Other         UNCLE   • Other Other         FAMILY HISTORY OF CERTAIN CHRONIC DISABLING DISEASES   • Colon cancer Neg Hx         Social History     Social History Narrative   • Not on file       Objective       Objective     Vital Signs:   /81 (BP Location: Left arm, Patient Position: Sitting, Cuff Size: Adult)   Pulse 67   Ht 165.1 cm (65\")   Wt 58.6 kg (129 lb 3.2 oz)   SpO2 100%   BMI 21.50 kg/m²     Body mass index is 21.5 kg/m².    Physical Exam  Constitutional:       General: She is not in acute distress.     Appearance: Normal " appearance. She is well-developed and normal weight.   Eyes:      Conjunctiva/sclera: Conjunctivae normal.      Pupils: Pupils are equal, round, and reactive to light.      Visual Fields: Right eye visual fields normal and left eye visual fields normal.   Cardiovascular:      Rate and Rhythm: Normal rate and regular rhythm.      Heart sounds: Normal heart sounds.   Pulmonary:      Effort: Pulmonary effort is normal. No retractions.      Breath sounds: Normal breath sounds and air entry.      Comments: Inspection of chest: normal appearance  Abdominal:      General: Bowel sounds are normal.      Palpations: Abdomen is soft.      Tenderness: There is abdominal tenderness in the epigastric area.      Comments: No appreciable hepatosplenomegaly   Musculoskeletal:      Cervical back: Neck supple.      Right lower leg: No edema.      Left lower leg: No edema.   Lymphadenopathy:      Cervical: No cervical adenopathy.   Skin:     Findings: No lesion.      Comments: Turgor normal   Neurological:      Mental Status: She is alert and oriented to person, place, and time.   Psychiatric:         Mood and Affect: Mood and affect normal.              Assessment & Plan          Assessment and Plan    Diagnoses and all orders for this visit:    1. Epigastric pain (Primary)    2. Nausea    3. Gastroesophageal reflux disease, unspecified whether esophagitis present    4. History of gastric ulcer    Other orders  -     sucralfate (Carafate) 1 g tablet; Take 1 tablet by mouth 4 (Four) Times a Day.  Dispense: 120 tablet; Refill: 3    52-year-old female presenting the office today with a history of epigastric pain, nausea, reflux, history of gastric ulcer.  I have recommended that the patient undergo further evaluation with an EGD.  I have discussed this procedure in detail with the patient.  I have discussed the risks, benefits and alternatives.  I have discussed the risk of anesthesia, bleeding and perforation.  Patient understands these  risks, benefits and alternatives and wishes to proceed.  I will schedule her at her earliest convenience.  I have added Carafate 1 g 4 times a day to her current regimen in hopes to improve reflux and epigastric pain.  Patient will follow-up in the office after endoscopy.  Patient agreeable to this plan will call with any questions or concerns.              Follow Up       Follow Up   Return for Follow up after endoscopy in office.  Patient was given instructions and counseling regarding her condition or for health maintenance advice. Please see specific information pulled into the AVS if appropriate.

## 2022-06-14 ENCOUNTER — PREP FOR SURGERY (OUTPATIENT)
Dept: OTHER | Facility: HOSPITAL | Age: 52
End: 2022-06-14

## 2022-06-14 ENCOUNTER — OFFICE VISIT (OUTPATIENT)
Dept: GASTROENTEROLOGY | Facility: CLINIC | Age: 52
End: 2022-06-14

## 2022-06-14 VITALS
HEIGHT: 65 IN | WEIGHT: 129.2 LBS | HEART RATE: 67 BPM | DIASTOLIC BLOOD PRESSURE: 81 MMHG | BODY MASS INDEX: 21.52 KG/M2 | SYSTOLIC BLOOD PRESSURE: 123 MMHG | OXYGEN SATURATION: 100 %

## 2022-06-14 DIAGNOSIS — R10.13 EPIGASTRIC PAIN: Primary | ICD-10-CM

## 2022-06-14 DIAGNOSIS — K21.9 GASTROESOPHAGEAL REFLUX DISEASE, UNSPECIFIED WHETHER ESOPHAGITIS PRESENT: ICD-10-CM

## 2022-06-14 DIAGNOSIS — R11.0 NAUSEA: ICD-10-CM

## 2022-06-14 DIAGNOSIS — Z87.11 HISTORY OF GASTRIC ULCER: ICD-10-CM

## 2022-06-14 PROCEDURE — 99214 OFFICE O/P EST MOD 30 MIN: CPT | Performed by: NURSE PRACTITIONER

## 2022-06-14 RX ORDER — SUCRALFATE 1 G/1
1 TABLET ORAL 4 TIMES DAILY
Qty: 120 TABLET | Refills: 3 | Status: SHIPPED | OUTPATIENT
Start: 2022-06-14 | End: 2022-10-25

## 2022-06-14 NOTE — PATIENT INSTRUCTIONS
Food Choices for Gastroesophageal Reflux Disease, Adult  When you have gastroesophageal reflux disease (GERD), the foods you eat and your eating habits are very important. Choosing the right foods can help ease your discomfort. Think about working with a food expert (dietitian) to help you make good choices.  What are tips for following this plan?  Reading food labels  Look for foods that are low in saturated fat. Foods that may help with your symptoms include:  Foods that have less than 5% of daily value (DV) of fat.  Foods that have 0 grams of trans fat.  Cooking  Do not knowles your food.  Cook your food by baking, steaming, grilling, or broiling. These are all methods that do not need a lot of fat for cooking.  To add flavor, try to use herbs that are low in spice and acidity.  Meal planning    Choose healthy foods that are low in fat, such as:  Fruits and vegetables.  Whole grains.  Low-fat dairy products.  Lean meats, fish, and poultry.  Eat small meals often instead of eating 3 large meals each day. Eat your meals slowly in a place where you are relaxed. Avoid bending over or lying down until 2-3 hours after eating.  Limit high-fat foods such as fatty meats or fried foods.  Limit your intake of fatty foods, such as oils, butter, and shortening.  Avoid the following as told by your doctor:  Foods that cause symptoms. These may be different for different people. Keep a food diary to keep track of foods that cause symptoms.  Alcohol.  Drinking a lot of liquid with meals.  Eating meals during the 2-3 hours before bed.    Lifestyle  Stay at a healthy weight. Ask your doctor what weight is healthy for you. If you need to lose weight, work with your doctor to do so safely.  Exercise for at least 30 minutes on 5 or more days each week, or as told by your doctor.  Wear loose-fitting clothes.  Do not smoke or use any products that contain nicotine or tobacco. If you need help quitting, ask your doctor.  Sleep with the head  of your bed higher than your feet. Use a wedge under the mattress or blocks under the bed frame to raise the head of the bed.  Chew sugar-free gum after meals.  What foods should eat?    Eat a healthy, well-balanced diet of fruits, vegetables, whole grains, low-fat dairy products, lean meats, fish, and poultry. Each person is different.  Foods that may cause symptoms in one person may not cause any symptoms in another person. Work with your doctor to find foods that are safe for you.  The items listed above may not be a complete list of what you can eat and drink. Contact a food expert for more options.  What foods should I avoid?  Limiting some of these foods may help in managing the symptoms of GERD. Everyone is different. Talk with a food expert or your doctor to help you find the exact foods to avoid, if any.  Fruits  Any fruits prepared with added fat. Any fruits that cause symptoms. For some people, this may include citrus fruits, such as oranges, grapefruit, pineapple, and shannan.  Vegetables  Deep-fried vegetables. French fries. Any vegetables prepared with added fat. Any vegetables that cause symptoms. For some people, this may include tomatoes and tomato products, chili peppers, onions and garlic, and horseradish.  Grains  Pastries or quick breads with added fat.  Meats and other proteins  High-fat meats, such as fatty beef or pork, hot dogs, ribs, ham, sausage, salami, and muniz. Fried meat or protein, including fried fish and fried chicken. Nuts and nut butters, in large amounts.  Dairy  Whole milk and chocolate milk. Sour cream. Cream. Ice cream. Cream cheese. Milkshakes.  Fats and oils  Butter. Margarine. Shortening. Ghee.  Beverages  Coffee and tea, with or without caffeine. Carbonated beverages. Sodas. Energy drinks. Fruit juice made with acidic fruits, such as orange or grapefruit. Tomato juice. Alcoholic drinks.  Sweets and desserts  Chocolate and cocoa. Donuts.  Seasonings and condiments  Pepper.  Peppermint and spearmint. Added salt. Any condiments, herbs, or seasonings that cause symptoms. For some people, this may include shanks, hot sauce, or vinegar-based salad dressings.  The items listed above may not be a complete list of what you should not eat and drink. Contact a food expert for more options.  Questions to ask your doctor  Diet and lifestyle changes are often the first steps that are taken to manage symptoms of GERD. If diet and lifestyle changes do not help, talk with your doctor about taking medicines.  Where to find more information  [employer]: aboutgerd.org  Summary  When you have GERD, food and lifestyle choices are very important in easing your symptoms.  Eat small meals often instead of 3 large meals a day. Eat your meals slowly and in a place where you are relaxed.  Avoid bending over or lying down until 2-3 hours after eating.  Limit high-fat foods such as fatty meats or fried foods.  This information is not intended to replace advice given to you by your health care provider. Make sure you discuss any questions you have with your health care provider.  Document Revised: 06/28/2021 Document Reviewed: 06/28/2021  Elsevier Patient Education © 2021 [employer]

## 2022-06-27 DIAGNOSIS — E03.9 ACQUIRED HYPOTHYROIDISM: ICD-10-CM

## 2022-06-28 RX ORDER — LEVOTHYROXINE SODIUM 88 UG/1
88 TABLET ORAL DAILY
Qty: 90 TABLET | Refills: 0 | OUTPATIENT
Start: 2022-06-28

## 2022-06-29 RX ORDER — LEVOTHYROXINE SODIUM 88 UG/1
88 CAPSULE ORAL DAILY
Qty: 90 CAPSULE | Refills: 0 | Status: SHIPPED | OUTPATIENT
Start: 2022-06-29 | End: 2022-08-29 | Stop reason: SDUPTHER

## 2022-08-26 ENCOUNTER — TELEPHONE (OUTPATIENT)
Dept: GASTROENTEROLOGY | Facility: CLINIC | Age: 52
End: 2022-08-26

## 2022-08-26 NOTE — TELEPHONE ENCOUNTER
PATIENT CALLED TO CX SCOPE, SHE DID NOT WISH TO RESCHEDULE AT THIS TIME. SHE WILL DISCUSS WITH PROVIDER AT November APPT.

## 2022-08-29 ENCOUNTER — LAB (OUTPATIENT)
Dept: LAB | Facility: HOSPITAL | Age: 52
End: 2022-08-29

## 2022-08-29 ENCOUNTER — OFFICE VISIT (OUTPATIENT)
Dept: FAMILY MEDICINE CLINIC | Facility: CLINIC | Age: 52
End: 2022-08-29

## 2022-08-29 VITALS
WEIGHT: 131.6 LBS | HEART RATE: 67 BPM | OXYGEN SATURATION: 99 % | SYSTOLIC BLOOD PRESSURE: 111 MMHG | BODY MASS INDEX: 21.92 KG/M2 | DIASTOLIC BLOOD PRESSURE: 71 MMHG | HEIGHT: 65 IN

## 2022-08-29 DIAGNOSIS — M79.10 MYALGIA: Primary | ICD-10-CM

## 2022-08-29 DIAGNOSIS — E03.9 ACQUIRED HYPOTHYROIDISM: ICD-10-CM

## 2022-08-29 DIAGNOSIS — R10.13 EPIGASTRIC DISCOMFORT: ICD-10-CM

## 2022-08-29 DIAGNOSIS — E55.9 VITAMIN D DEFICIENCY: ICD-10-CM

## 2022-08-29 DIAGNOSIS — M25.50 ARTHRALGIA, UNSPECIFIED JOINT: ICD-10-CM

## 2022-08-29 DIAGNOSIS — M79.10 MYALGIA: ICD-10-CM

## 2022-08-29 DIAGNOSIS — E03.9 ACQUIRED HYPOTHYROIDISM: Chronic | ICD-10-CM

## 2022-08-29 LAB
25(OH)D3 SERPL-MCNC: 53 NG/ML (ref 30–100)
CHROMATIN AB SERPL-ACNC: <10 IU/ML (ref 0–14)
CRP SERPL-MCNC: <0.3 MG/DL (ref 0–0.5)
ERYTHROCYTE [SEDIMENTATION RATE] IN BLOOD: 5 MM/HR (ref 0–30)
FOLATE SERPL-MCNC: 6.23 NG/ML (ref 4.78–24.2)
T4 FREE SERPL-MCNC: 1.31 NG/DL (ref 0.93–1.7)
TSH SERPL DL<=0.05 MIU/L-ACNC: 2.22 UIU/ML (ref 0.27–4.2)
URATE SERPL-MCNC: 3.7 MG/DL (ref 2.4–5.7)
VIT B12 BLD-MCNC: 1616 PG/ML (ref 211–946)

## 2022-08-29 PROCEDURE — 86140 C-REACTIVE PROTEIN: CPT

## 2022-08-29 PROCEDURE — 82306 VITAMIN D 25 HYDROXY: CPT

## 2022-08-29 PROCEDURE — 84439 ASSAY OF FREE THYROXINE: CPT

## 2022-08-29 PROCEDURE — 86431 RHEUMATOID FACTOR QUANT: CPT

## 2022-08-29 PROCEDURE — 84443 ASSAY THYROID STIM HORMONE: CPT

## 2022-08-29 PROCEDURE — 84550 ASSAY OF BLOOD/URIC ACID: CPT

## 2022-08-29 PROCEDURE — 82607 VITAMIN B-12: CPT

## 2022-08-29 PROCEDURE — 86225 DNA ANTIBODY NATIVE: CPT

## 2022-08-29 PROCEDURE — 36415 COLL VENOUS BLD VENIPUNCTURE: CPT

## 2022-08-29 PROCEDURE — 82746 ASSAY OF FOLIC ACID SERUM: CPT

## 2022-08-29 PROCEDURE — 85652 RBC SED RATE AUTOMATED: CPT

## 2022-08-29 PROCEDURE — 86038 ANTINUCLEAR ANTIBODIES: CPT

## 2022-08-29 PROCEDURE — 99214 OFFICE O/P EST MOD 30 MIN: CPT | Performed by: PHYSICIAN ASSISTANT

## 2022-08-29 RX ORDER — LEVOTHYROXINE SODIUM 88 UG/1
88 CAPSULE ORAL DAILY
Qty: 90 CAPSULE | Refills: 1 | Status: SHIPPED | OUTPATIENT
Start: 2022-08-29 | End: 2023-01-30 | Stop reason: SDUPTHER

## 2022-08-29 NOTE — PROGRESS NOTES
Chief Complaint  Chief Complaint   Patient presents with   • Hypothyroidism       Subjective          Melissa Ruth presents to [facility]  History of Present Illness     Patient is here today for a 6 month follow up for Hypothyroidism. Patient states that she is doing well in general with however has complaints of upper/epigastic discomfort and generalized aches after working out or strenuous activity.    Hypothyroidism: Patient is currently on Tirosint 88MCG and doing well. Patient states energy is good. Patient denies weight changes, bowel changes and changes in hair, skin and nails. Patient is requesting a refill today.     Myalgia/Arthralgia: patient noticed for about the last month after activity; states that she has both generalized joint pain and muscle aches; no specific joint or muscle region; no new activity; patient does take collagen regularly without improvement.    Epigastic discomfort: patient is seeing GI; canceled testing for tomorrow; discontinued carafate as it made symptoms worse. No probiotic. No nausea or vomiting; has f/u with GI.    MAMMO: 1/11/22  COLON: 11/30/20    Recent appt with [facility] for GYN.    Medical History: has a past medical history of Alcoholism ([facility]), Allergic (20 years ago), Allergies, Anxiety, Arthritis, Bursitis (09/05/2017), Chemical dependency ([facility]), Condition not found, Depression, Gastrocnemius strain, left (09/05/2017), GERD (gastroesophageal reflux disease) (2010), Hypothyroidism (08/18/2015), Neuromuscular disorder ([facility]) (09/05/2017), Night sweats, Seasonal allergies, Substance abuse ([facility]) (1994), Thyroid disorder, and Ulcer (2010).   Surgical History: has a past surgical history that includes Colonoscopy; Other surgical history; Knee surgery (1997); Tonsillectomy; Wrist surgery (2014); and Upper gastrointestinal endoscopy (2000’s).   Family History: family history includes Alcohol abuse in her father; Arthritis in her father and  "mother; Lung cancer in an other family member; Other in an other family member; Stroke in her father.   Social History: reports that she quit smoking about 18 years ago. She started smoking about 38 years ago. She has a 10.00 pack-year smoking history. She has never used smokeless tobacco. She reports previous alcohol use. She reports current drug use. Drug: Marijuana.    Allergies: Penicillins    Health Maintenance Due   Topic Date Due   • ANNUAL PHYSICAL  Never done       Immunization History   Administered Date(s) Administered   • Fluzone Quad >6mos (Multi-dose) 11/30/2020   • Hepatitis A 04/24/2018   • Influenza, Unspecified 11/30/2020       Objective     Vitals:    08/29/22 1004   BP: 111/71   Pulse: 67   SpO2: 99%   Weight: 59.7 kg (131 lb 9.6 oz)   Height: 165.1 cm (65\")     Body mass index is 21.9 kg/m².     Wt Readings from Last 3 Encounters:   08/29/22 59.7 kg (131 lb 9.6 oz)   06/14/22 58.6 kg (129 lb 3.2 oz)   03/11/22 58.9 kg (129 lb 12.8 oz)     BP Readings from Last 3 Encounters:   08/29/22 111/71   06/14/22 123/81   03/11/22 109/79       BMI is within normal parameters. No other follow-up for BMI required.      Patient Care Team:  Lluvia Perry PA as PCP - General (Family Medicine)  Heber, April, APRN as Nurse Practitioner (Nurse Practitioner)    Physical Exam  Vitals and nursing note reviewed.   Constitutional:       Appearance: Normal appearance.   HENT:      Head: Normocephalic and atraumatic.   Neck:      Vascular: No carotid bruit.      Comments: Thyroid : gland size normal, nontender, no nodules or masses present on palpation   Cardiovascular:      Rate and Rhythm: Normal rate and regular rhythm.      Pulses: Normal pulses.      Heart sounds: Normal heart sounds.   Pulmonary:      Effort: Pulmonary effort is normal.      Breath sounds: Normal breath sounds.   Abdominal:      Palpations: Abdomen is soft.      Tenderness: There is abdominal tenderness (epigastric).   Musculoskeletal:    "   Cervical back: Neck supple. No tenderness.      Right lower leg: No edema.      Left lower leg: No edema.   Lymphadenopathy:      Cervical: No cervical adenopathy.   Neurological:      Mental Status: She is alert.   Psychiatric:         Mood and Affect: Mood normal.         Behavior: Behavior normal.           Result Review :                           Assessment and Plan      Diagnoses and all orders for this visit:    1. Myalgia (Primary)  Comments:  New problem unsure of prognosis; continue collagen and check labs.  Orders:  -     Uric acid; Future  -     Rheumatoid factor; Future  -     C-reactive protein; Future  -     KELLY; Future  -     Sedimentation Rate; Future  -     Vitamin B12; Future  -     Folate; Future    2. Arthralgia, unspecified joint  Comments:  New problem unsure of prognosis; continue collagen and check labs.  Orders:  -     Uric acid; Future  -     Rheumatoid factor; Future  -     C-reactive protein; Future  -     KELLY; Future  -     Sedimentation Rate; Future    3. Acquired hypothyroidism  Comments:  Stable; continue with Tirosint 88mcg daily; check labs and f/u in 6mths.  Orders:  -     TSH+Free T4; Future  -     Tirosint 88 MCG capsule; Take 1 capsule by mouth Daily.  Dispense: 90 capsule; Refill: 1    4. Vitamin D deficiency  Comments:  Check lab to determine stability.  Orders:  -     Vitamin D 25 Hydroxy; Future    5. Epigastric discomfort  Comments:  Trial of probiotic and f/u with GI.            Follow Up     Return in about 6 months (around 2/28/2023).    Patient was given instructions and counseling regarding her condition or for health maintenance advice. Please see specific information pulled into the AVS if appropriate.

## 2022-08-30 LAB
DSDNA IGG SERPL IA-ACNC: NEGATIVE [IU]/ML
NUCLEAR IGG SER IA-RTO: NEGATIVE

## 2022-10-04 PROCEDURE — 88305 TISSUE EXAM BY PATHOLOGIST: CPT | Performed by: OBSTETRICS & GYNECOLOGY

## 2022-10-05 ENCOUNTER — LAB REQUISITION (OUTPATIENT)
Dept: LAB | Facility: HOSPITAL | Age: 52
End: 2022-10-05

## 2022-10-05 DIAGNOSIS — N92.0 EXCESSIVE AND FREQUENT MENSTRUATION WITH REGULAR CYCLE: ICD-10-CM

## 2022-10-07 LAB
CYTO UR: NORMAL
LAB AP CASE REPORT: NORMAL
LAB AP CLINICAL INFORMATION: NORMAL
PATH REPORT.FINAL DX SPEC: NORMAL
PATH REPORT.GROSS SPEC: NORMAL

## 2022-10-25 ENCOUNTER — OFFICE VISIT (OUTPATIENT)
Dept: FAMILY MEDICINE CLINIC | Facility: CLINIC | Age: 52
End: 2022-10-25

## 2022-10-25 VITALS
HEART RATE: 70 BPM | DIASTOLIC BLOOD PRESSURE: 90 MMHG | SYSTOLIC BLOOD PRESSURE: 116 MMHG | OXYGEN SATURATION: 99 % | HEIGHT: 65 IN | BODY MASS INDEX: 22.49 KG/M2 | WEIGHT: 135 LBS

## 2022-10-25 DIAGNOSIS — M54.50 ACUTE RIGHT-SIDED LOW BACK PAIN, UNSPECIFIED WHETHER SCIATICA PRESENT: Primary | ICD-10-CM

## 2022-10-25 DIAGNOSIS — R31.9 HEMATURIA, UNSPECIFIED TYPE: ICD-10-CM

## 2022-10-25 DIAGNOSIS — E55.9 VITAMIN D DEFICIENCY: ICD-10-CM

## 2022-10-25 LAB
BILIRUB BLD-MCNC: NEGATIVE MG/DL
CLARITY, POC: CLEAR
COLOR UR: YELLOW
EXPIRATION DATE: ABNORMAL
GLUCOSE UR STRIP-MCNC: NEGATIVE MG/DL
KETONES UR QL: NEGATIVE
LEUKOCYTE EST, POC: NEGATIVE
Lab: ABNORMAL
NITRITE UR-MCNC: NEGATIVE MG/ML
PH UR: 6.5 [PH]
PROT UR STRIP-MCNC: NEGATIVE MG/DL
RBC # UR STRIP: ABNORMAL /UL
UROBILINOGEN UR QL: ABNORMAL

## 2022-10-25 PROCEDURE — 99214 OFFICE O/P EST MOD 30 MIN: CPT | Performed by: NURSE PRACTITIONER

## 2022-10-25 PROCEDURE — 81003 URINALYSIS AUTO W/O SCOPE: CPT | Performed by: NURSE PRACTITIONER

## 2022-10-25 PROCEDURE — 87086 URINE CULTURE/COLONY COUNT: CPT | Performed by: NURSE PRACTITIONER

## 2022-10-25 RX ORDER — METHOCARBAMOL 500 MG/1
500 TABLET, FILM COATED ORAL 2 TIMES DAILY PRN
Qty: 20 TABLET | Refills: 0 | Status: SHIPPED | OUTPATIENT
Start: 2022-10-25 | End: 2023-01-30

## 2022-10-25 NOTE — PROGRESS NOTES
Chief Complaint  Back Pain (Right sided lower back pain possibly from working out at the gym. Patient has been taking ibuprofen but has not helped much) and Vitamin D Deficiency    SUBJECTIVE  Melissa Ruth presents to [facility] patient presents today with complaints of right side middle and lower back pain.  Pt states that she was working out at gym and felt like she pulled a muscle, states this has been going on for about a week, not getting any better. Pt states she has stopped working out, has tried some IBP but it tends to upset stomach, gets nauseated. Pt states has been doing heat as well which helps.   History of Present Illness  Past Medical History:   Diagnosis Date   • Alcoholism ([facility])    • Allergic 20 years ago   • Allergies    • Anxiety    • Arthritis    • Bursitis 09/05/2017    PRE PATELLA LEFT   • Chemical dependency ([facility])    • Condition not found     ULCER   • Depression    • Gastrocnemius strain, left 09/05/2017   • GERD (gastroesophageal reflux disease) 2010   • Hypothyroidism 08/18/2015   • Neuromuscular disorder ([facility]) 09/05/2017   • Night sweats    • Seasonal allergies    • Substance abuse ([facility]) 1994   • Thyroid disorder    • Ulcer 2010      Family History   Problem Relation Age of Onset   • Arthritis Mother    • Stroke Father    • Arthritis Father    • Alcohol abuse Father    • Lung cancer Other         UNCLE   • Other Other         FAMILY HISTORY OF CERTAIN CHRONIC DISABLING DISEASES   • Colon cancer Neg Hx       Past Surgical History:   Procedure Laterality Date   • COLONOSCOPY     • KNEE SURGERY  1997   • OTHER SURGICAL HISTORY      METAL IMPLANT   • TONSILLECTOMY     • UPPER GASTROINTESTINAL ENDOSCOPY  2000’s   • WRIST SURGERY  2014        Current Outpatient Medications:   •  cetirizine (zyrTEC) 10 MG tablet, Take 10 mg by mouth Daily., Disp: , Rfl:   •  Tirosint 88 MCG capsule, Take 1 capsule by mouth Daily., Disp: 90 capsule, Rfl: 1  •  vitamin D3 125  "MCG (5000 UT) capsule capsule, Take 1 capsule by mouth Daily., Disp: 30 capsule, Rfl: 2  •  diclofenac (VOLTAREN) 50 MG EC tablet, Take 1 tablet by mouth 2 (Two) Times a Day., Disp: 30 tablet, Rfl: 0  •  methocarbamol (Robaxin) 500 MG tablet, Take 1 tablet by mouth 2 (Two) Times a Day As Needed for Muscle Spasms., Disp: 20 tablet, Rfl: 0    OBJECTIVE  Vital Signs:   /90   Pulse 70   Ht 165.1 cm (65\")   Wt 61.2 kg (135 lb)   SpO2 99%   BMI 22.47 kg/m²    Estimated body mass index is 22.47 kg/m² as calculated from the following:    Height as of this encounter: 165.1 cm (65\").    Weight as of this encounter: 61.2 kg (135 lb).     Wt Readings from Last 3 Encounters:   10/25/22 61.2 kg (135 lb)   08/29/22 59.7 kg (131 lb 9.6 oz)   06/14/22 58.6 kg (129 lb 3.2 oz)     BP Readings from Last 3 Encounters:   10/25/22 116/90   08/29/22 111/71   06/14/22 123/81       Physical Exam  Vitals reviewed.   Constitutional:       Appearance: Normal appearance. She is well-developed.   HENT:      Head: Normocephalic and atraumatic.      Right Ear: External ear normal.      Left Ear: External ear normal.      Mouth/Throat:      Pharynx: No oropharyngeal exudate.   Eyes:      Conjunctiva/sclera: Conjunctivae normal.      Pupils: Pupils are equal, round, and reactive to light.   Cardiovascular:      Rate and Rhythm: Normal rate and regular rhythm.      Heart sounds: No murmur heard.    No friction rub. No gallop.   Pulmonary:      Effort: Pulmonary effort is normal.      Breath sounds: Normal breath sounds. No wheezing or rhonchi.   Abdominal:      General: Bowel sounds are normal. There is no distension.      Palpations: Abdomen is soft.      Tenderness: There is no abdominal tenderness. There is no right CVA tenderness or left CVA tenderness.   Musculoskeletal:      Thoracic back: No tenderness or bony tenderness.      Lumbar back: No tenderness or bony tenderness. Negative right straight leg raise test and negative left " straight leg raise test.   Skin:     General: Skin is warm and dry.   Neurological:      Mental Status: She is alert and oriented to person, place, and time.      Cranial Nerves: No cranial nerve deficit.   Psychiatric:         Mood and Affect: Mood and affect normal.         Behavior: Behavior normal.         Thought Content: Thought content normal.         Judgment: Judgment normal.          Result Review    Lab Results   Component Value Date    COLORU Yellow 10/25/2022    CLARITYU Clear 10/25/2022    PHUR 6.5 10/25/2022    KETONESU Negative 10/25/2022    BILIRUBINUR Negative 10/25/2022    LEUKOCYTESUR Negative 10/25/2022    UROBILINOGEN 0.2 E.U./dL 10/25/2022       No Images in the past 120 days found..     The above data has been reviewed by SONNY Coe 10/25/2022 09:51 EDT.          Patient Care Team:  Lluvia Perry PA as PCP - General (Family Medicine)  Heber, April, APRN as Nurse Practitioner (Nurse Practitioner)    BMI is within normal parameters. No other follow-up for BMI required.       ASSESSMENT & PLAN    Diagnoses and all orders for this visit:    1. Acute right-sided low back pain, unspecified whether sciatica present (Primary)  -     POCT urinalysis dipstick, automated  -     methocarbamol (Robaxin) 500 MG tablet; Take 1 tablet by mouth 2 (Two) Times a Day As Needed for Muscle Spasms.  Dispense: 20 tablet; Refill: 0  -     diclofenac (VOLTAREN) 50 MG EC tablet; Take 1 tablet by mouth 2 (Two) Times a Day.  Dispense: 30 tablet; Refill: 0    2. Vitamin D deficiency  Comments:  Labs ordered today, refill sent  Orders:  -     vitamin D3 125 MCG (5000 UT) capsule capsule; Take 1 capsule by mouth Daily.  Dispense: 30 capsule; Refill: 2    3. Hematuria, unspecified type  -     Urine Culture - Urine, Urine, Clean Catch; Future  -     Urine Culture - Urine, Urine, Clean Catch       Order for x-ray given, patient request to have distantly controlled diagnostics.  KUB ordered  Tobacco Use:  Medium Risk   • Smoking Tobacco Use: Former   • Smokeless Tobacco Use: Never   • Passive Exposure: Not on file       Follow Up     Return if symptoms worsen or fail to improve.        Patient was given instructions and counseling regarding her condition or for health maintenance advice. Please see specific information pulled into the AVS if appropriate.   I have reviewed information obtained and documented by others and I have confirmed the accuracy of this documented note.    SONNY Coe

## 2022-10-26 LAB — BACTERIA SPEC AEROBE CULT: NO GROWTH

## 2022-11-14 NOTE — PROGRESS NOTES
Chief Complaint  EGD request    Melissa Ruth is a 52 y.o. female who presents to [facility] for EGD request     History of present Illness  Patient presents to the office for EGD request.  At previous office visit patient had complaints of epigastric pain, nausea, and reflux.  Patient was given prescription for Carafate 4 times daily and advised to proceed with EGD for further evaluation.  Patient ultimately canceled EGD due to estimated cost out-of-pocket.  Patient reports completing a few days of Carafate but ultimately discontinued medication due to increased heartburn.  Patient reports resolution of previous symptoms of nausea, epigastric pain, and persistent heartburn.  Overall patient is doing very well from a GI standpoint and has no complaints.    EGD and colonoscopy 03/16/2020 by Dr. Armstrong - Candida esophagitis, normal stomach and duodenum.  Normal mucosa throughout colon.  Repeat colonoscopy in 10 years    Past Medical History:   Diagnosis Date   • Alcoholism ([facility])    • Allergic 20 years ago   • Allergies    • Anxiety    • Arthritis    • Bursitis 09/05/2017    PRE PATELLA LEFT   • Chemical dependency ([facility])    • Condition not found     ULCER   • Depression    • Gastrocnemius strain, left 09/05/2017   • GERD (gastroesophageal reflux disease) 2010   • Hypothyroidism 08/18/2015   • Neuromuscular disorder ([facility]) 09/05/2017   • Night sweats    • Seasonal allergies    • Substance abuse ([facility]) 1994   • Thyroid disorder    • Ulcer 2010       Past Surgical History:   Procedure Laterality Date   • COLONOSCOPY     • KNEE SURGERY  1997   • OTHER SURGICAL HISTORY      METAL IMPLANT   • TONSILLECTOMY     • UPPER GASTROINTESTINAL ENDOSCOPY  2000’s   • WRIST SURGERY  2014         Current Outpatient Medications:   •  cetirizine (zyrTEC) 10 MG tablet, Take 10 mg by mouth Daily., Disp: , Rfl:   •  methocarbamol (Robaxin) 500 MG tablet, Take 1 tablet by mouth 2 (Two) Times a Day  "As Needed for Muscle Spasms., Disp: 20 tablet, Rfl: 0  •  Tirosint 88 MCG capsule, Take 1 capsule by mouth Daily., Disp: 90 capsule, Rfl: 1  •  vitamin D3 125 MCG (5000 UT) capsule capsule, Take 1 capsule by mouth Daily., Disp: 30 capsule, Rfl: 2  •  diclofenac (VOLTAREN) 50 MG EC tablet, Take 1 tablet by mouth 2 (Two) Times a Day., Disp: 30 tablet, Rfl: 0     Allergies   Allergen Reactions   • Penicillins Itching     Positive on allergy skin test        Family History   Problem Relation Age of Onset   • Arthritis Mother    • Stroke Father    • Arthritis Father    • Alcohol abuse Father    • Lung cancer Other         UNCLE   • Other Other         FAMILY HISTORY OF CERTAIN CHRONIC DISABLING DISEASES   • Colon cancer Neg Hx         Social History     Social History Narrative   • Not on file       Objective       Vital Signs:   /69 (BP Location: Left arm, Patient Position: Sitting, Cuff Size: Adult)   Pulse 77   Ht 165.1 cm (65\")   Wt 62.5 kg (137 lb 12.8 oz)   SpO2 100%   BMI 22.93 kg/m²       Physical Exam  Constitutional:       Appearance: Normal appearance.   HENT:      Head: Normocephalic.   Cardiovascular:      Rate and Rhythm: Normal rate and regular rhythm.      Heart sounds: Normal heart sounds.   Pulmonary:      Effort: Pulmonary effort is normal.      Breath sounds: Normal breath sounds.   Abdominal:      General: Bowel sounds are normal.      Palpations: Abdomen is soft.   Skin:     General: Skin is warm and dry.   Neurological:      Mental Status: She is alert and oriented to person, place, and time. Mental status is at baseline.   Psychiatric:         Mood and Affect: Mood normal.         Behavior: Behavior normal.         Thought Content: Thought content normal.         Judgment: Judgment normal.         Result Review :       CBC w/diff    CBC w/Diff 5/5/22   WBC 6.05   RBC 4.43   Hemoglobin 12.9   Hematocrit 39.4   MCV 88.9   MCH 29.1   MCHC 32.7   RDW 12.4   Platelets 264   Neutrophil Rel % " 62.7   Immature Granulocyte Rel % 0.3   Lymphocyte Rel % 27.8   Monocyte Rel % 7.3   Eosinophil Rel % 1.2   Basophil Rel % 0.7           CMP    CMP 5/5/22   Glucose 86   BUN 11   Creatinine 0.89   Sodium 137   Potassium 4.8   Chloride 103   Calcium 9.5   Albumin 4.10   Total Bilirubin 0.5   Alkaline Phosphatase 63   AST (SGOT) 23   ALT (SGPT) 20                   Assessment and Plan    Diagnoses and all orders for this visit:    1. Epigastric pain (Primary)      52 year old patient presents to the office for EGD request.  At previous office visit patient had complaints of epigastric pain, nausea, and reflux.  Patient was given prescription for Carafate 4 times daily and advised to proceed with EGD for further evaluation.  Patient ultimately canceled EGD due to estimated cost out-of-pocket.  Patient reports resolution of symptoms and is no longer taking Carafate or other daily reflux medication.  Patient will call the office if she would like to proceed with EGD or if symptoms return.  Patient is agreeable to plan will call the office any questions or concerns.    Follow Up   No follow-ups on file.  Patient was given instructions and counseling regarding her condition or for health maintenance advice. Please see specific information pulled into the AVS if appropriate.

## 2022-11-15 ENCOUNTER — OFFICE VISIT (OUTPATIENT)
Dept: GASTROENTEROLOGY | Facility: CLINIC | Age: 52
End: 2022-11-15

## 2022-11-15 VITALS
BODY MASS INDEX: 22.96 KG/M2 | OXYGEN SATURATION: 100 % | DIASTOLIC BLOOD PRESSURE: 69 MMHG | HEIGHT: 65 IN | WEIGHT: 137.8 LBS | SYSTOLIC BLOOD PRESSURE: 108 MMHG | HEART RATE: 77 BPM

## 2022-11-15 DIAGNOSIS — R10.13 EPIGASTRIC PAIN: Primary | ICD-10-CM

## 2022-11-15 PROCEDURE — 99213 OFFICE O/P EST LOW 20 MIN: CPT

## 2022-12-06 ENCOUNTER — PREP FOR SURGERY (OUTPATIENT)
Dept: OTHER | Facility: HOSPITAL | Age: 52
End: 2022-12-06

## 2022-12-06 DIAGNOSIS — N92.4 EXCESSIVE BLEEDING IN PREMENOPAUSAL PERIOD: Primary | ICD-10-CM

## 2022-12-06 RX ORDER — SODIUM CHLORIDE 9 MG/ML
40 INJECTION, SOLUTION INTRAVENOUS AS NEEDED
Status: CANCELLED | OUTPATIENT
Start: 2022-12-06

## 2022-12-06 RX ORDER — SODIUM CHLORIDE 0.9 % (FLUSH) 0.9 %
3 SYRINGE (ML) INJECTION EVERY 12 HOURS SCHEDULED
Status: CANCELLED | OUTPATIENT
Start: 2022-12-06

## 2022-12-06 RX ORDER — SODIUM CHLORIDE 0.9 % (FLUSH) 0.9 %
1-10 SYRINGE (ML) INJECTION AS NEEDED
Status: CANCELLED | OUTPATIENT
Start: 2022-12-06

## 2022-12-06 RX ORDER — CEFAZOLIN SODIUM 1 G/50ML
1 INJECTION, SOLUTION INTRAVENOUS ONCE
Status: CANCELLED | OUTPATIENT
Start: 2022-12-06 | End: 2022-12-06

## 2022-12-13 ENCOUNTER — TRANSCRIBE ORDERS (OUTPATIENT)
Dept: ADMINISTRATIVE | Facility: HOSPITAL | Age: 52
End: 2022-12-13

## 2022-12-13 DIAGNOSIS — Z12.31 SCREENING MAMMOGRAM, ENCOUNTER FOR: Primary | ICD-10-CM

## 2022-12-13 PROBLEM — N92.4 EXCESSIVE BLEEDING IN PREMENOPAUSAL PERIOD: Status: ACTIVE | Noted: 2022-12-13

## 2023-01-27 NOTE — PROGRESS NOTES
Chief Complaint  Chief Complaint   Patient presents with   • Anxiety   • Hypothyroidism   • Heartburn   • Follow-up     6 month       Subjective          Melissa Ruth presents to [facility] for 6 month follow up for hypothyroidism, GERD.    History of Present Illness    Hypothyroidism: Patient is currently on Tirosint and doing well. Patient states energy is good. Patient denies weight changes, bowel changes and changes in hair, skin and nails.    Vitamin D def: doing well without complaints    States myalgia and arthralgia has improved.    Colon: 11-30-20  Mammo: 1-11-22 ([facility])    Medical History: has a past medical history of Alcoholism ([facility]), Allergic (20 years ago), Allergies, Anxiety, Arthritis, Bursitis (09/05/2017), Chemical dependency ([facility]), Condition not found, Depression, Gastrocnemius strain, left (09/05/2017), GERD (gastroesophageal reflux disease) (2010), Hypothyroidism (08/18/2015), Neuromuscular disorder ([facility]) (09/05/2017), Night sweats, Seasonal allergies, Substance abuse ([facility]) (1994), Thyroid disorder, and Ulcer (2010).   Surgical History: has a past surgical history that includes Colonoscopy; Other surgical history; Knee surgery (1997); Tonsillectomy; Wrist surgery (2014); and Upper gastrointestinal endoscopy (2000’s).   Family History: family history includes Alcohol abuse in her father; Arthritis in her father and mother; Lung cancer in an other family member; Other in an other family member; Stroke in her father.   Social History: reports that she quit smoking about 18 years ago. Her smoking use included cigarettes. She started smoking about 38 years ago. She has a 10.00 pack-year smoking history. She has been exposed to tobacco smoke. She has never used smokeless tobacco. She reports that she does not currently use alcohol. She reports current drug use. Drug: Marijuana.    Allergies: Penicillins    Health Maintenance Due   Topic Date Due   • ANNUAL  "PHYSICAL  Never done       Immunization History   Administered Date(s) Administered   • Fluzone Quad >6mos (Multi-dose) 11/30/2020   • Hepatitis A 04/24/2018   • Influenza, Unspecified 11/30/2020       Objective     Vitals:    01/30/23 1004   BP: 111/79   BP Location: Right arm   Patient Position: Sitting   Cuff Size: Adult   Pulse: 66   Resp: 16   SpO2: 100%   Weight: 60.9 kg (134 lb 3.2 oz)   Height: 165.1 cm (65\")     Body mass index is 22.33 kg/m².     Wt Readings from Last 3 Encounters:   01/30/23 60.9 kg (134 lb 3.2 oz)   11/15/22 62.5 kg (137 lb 12.8 oz)   10/25/22 61.2 kg (135 lb)     BP Readings from Last 3 Encounters:   01/30/23 111/79   11/15/22 108/69   10/25/22 116/90       BMI is within normal parameters. No other follow-up for BMI required.      Patient Care Team:  Lluvia Perry PA as PCP - General (Family Medicine)  Ashley Buck APRN as Nurse Practitioner (Nurse Practitioner)  Huan Caro MD as Consulting Physician (Gastroenterology)  Tracey Smart APRN as Nurse Practitioner (Gastroenterology)    Physical Exam  Vitals and nursing note reviewed.   Constitutional:       Appearance: Normal appearance.   HENT:      Head: Normocephalic and atraumatic.   Neck:      Vascular: No carotid bruit.      Comments: Thyroid : gland size normal, nontender, no nodules or masses present on palpation   Cardiovascular:      Rate and Rhythm: Normal rate and regular rhythm.      Pulses: Normal pulses.      Heart sounds: Normal heart sounds.   Pulmonary:      Effort: Pulmonary effort is normal.      Breath sounds: Normal breath sounds.   Musculoskeletal:      Cervical back: Neck supple. No tenderness.   Lymphadenopathy:      Cervical: No cervical adenopathy.   Neurological:      Mental Status: She is alert.   Psychiatric:         Mood and Affect: Mood normal.         Behavior: Behavior normal.           Result Review :                           Assessment and Plan      Diagnoses and all orders " for this visit:    1. Screening for lipid disorders (Primary)  -     Lipid Panel; Future    2. Acquired hypothyroidism  Comments:  Stable; continue with Tirosint 88mcg daily; check labs and f/u in 6mths.  Orders:  -     TSH; Future  -     T4, Free; Future  -     Tirosint 88 MCG capsule; Take 1 capsule by mouth Daily.  Dispense: 90 capsule; Refill: 1    3. Vitamin D deficiency  Comments:  Stable: Labs ordered today, refill sent  Orders:  -     Vitamin D,25-Hydroxy; Future  -     vitamin D3 125 MCG (5000 UT) capsule capsule; Take 1 capsule by mouth Daily.  Dispense: 90 capsule; Refill: 1    4. Screening for deficiency anemia  -     CBC & Differential; Future    5. Screening for diabetes mellitus (DM)  -     Comprehensive Metabolic Panel; Future    6. B12 deficiency  -     Vitamin B12; Future  -     Folate; Future            Follow Up     Return in about 6 months (around 7/30/2023).    Patient was given instructions and counseling regarding her condition or for health maintenance advice. Please see specific information pulled into the AVS if appropriate.

## 2023-01-30 ENCOUNTER — OFFICE VISIT (OUTPATIENT)
Dept: FAMILY MEDICINE CLINIC | Facility: CLINIC | Age: 53
End: 2023-01-30
Payer: COMMERCIAL

## 2023-01-30 VITALS
BODY MASS INDEX: 22.36 KG/M2 | HEIGHT: 65 IN | HEART RATE: 66 BPM | WEIGHT: 134.2 LBS | OXYGEN SATURATION: 100 % | SYSTOLIC BLOOD PRESSURE: 111 MMHG | DIASTOLIC BLOOD PRESSURE: 79 MMHG | RESPIRATION RATE: 16 BRPM

## 2023-01-30 DIAGNOSIS — Z13.1 SCREENING FOR DIABETES MELLITUS (DM): ICD-10-CM

## 2023-01-30 DIAGNOSIS — E53.8 B12 DEFICIENCY: ICD-10-CM

## 2023-01-30 DIAGNOSIS — E55.9 VITAMIN D DEFICIENCY: Chronic | ICD-10-CM

## 2023-01-30 DIAGNOSIS — E03.9 ACQUIRED HYPOTHYROIDISM: Chronic | ICD-10-CM

## 2023-01-30 DIAGNOSIS — Z13.220 SCREENING FOR LIPID DISORDERS: Primary | ICD-10-CM

## 2023-01-30 DIAGNOSIS — Z13.0 SCREENING FOR DEFICIENCY ANEMIA: ICD-10-CM

## 2023-01-30 PROCEDURE — 99214 OFFICE O/P EST MOD 30 MIN: CPT | Performed by: PHYSICIAN ASSISTANT

## 2023-01-30 RX ORDER — LEVOTHYROXINE SODIUM 88 UG/1
88 CAPSULE ORAL DAILY
Qty: 90 CAPSULE | Refills: 1 | Status: SHIPPED | OUTPATIENT
Start: 2023-01-30

## 2023-01-31 RX ORDER — MULTIPLE VITAMINS W/ MINERALS TAB 9MG-400MCG
1 TAB ORAL DAILY
COMMUNITY

## 2023-02-02 ENCOUNTER — LAB (OUTPATIENT)
Dept: LAB | Facility: HOSPITAL | Age: 53
End: 2023-02-02
Payer: COMMERCIAL

## 2023-02-02 DIAGNOSIS — Z13.0 SCREENING FOR DEFICIENCY ANEMIA: ICD-10-CM

## 2023-02-02 DIAGNOSIS — Z13.220 SCREENING FOR LIPID DISORDERS: ICD-10-CM

## 2023-02-02 DIAGNOSIS — E53.8 B12 DEFICIENCY: ICD-10-CM

## 2023-02-02 DIAGNOSIS — E03.9 ACQUIRED HYPOTHYROIDISM: Chronic | ICD-10-CM

## 2023-02-02 DIAGNOSIS — Z13.1 SCREENING FOR DIABETES MELLITUS (DM): ICD-10-CM

## 2023-02-02 DIAGNOSIS — E55.9 VITAMIN D DEFICIENCY: Chronic | ICD-10-CM

## 2023-02-02 LAB
25(OH)D3 SERPL-MCNC: 62 NG/ML (ref 30–100)
ALBUMIN SERPL-MCNC: 4.5 G/DL (ref 3.5–5.2)
ALBUMIN/GLOB SERPL: 2.4 G/DL
ALP SERPL-CCNC: 64 U/L (ref 39–117)
ALT SERPL W P-5'-P-CCNC: 35 U/L (ref 1–33)
ANION GAP SERPL CALCULATED.3IONS-SCNC: 8.8 MMOL/L (ref 5–15)
AST SERPL-CCNC: 31 U/L (ref 1–32)
BASOPHILS # BLD AUTO: 0.03 10*3/MM3 (ref 0–0.2)
BASOPHILS NFR BLD AUTO: 0.4 % (ref 0–1.5)
BILIRUB SERPL-MCNC: 0.6 MG/DL (ref 0–1.2)
BUN SERPL-MCNC: 11 MG/DL (ref 6–20)
BUN/CREAT SERPL: 12.2 (ref 7–25)
CALCIUM SPEC-SCNC: 9.5 MG/DL (ref 8.6–10.5)
CHLORIDE SERPL-SCNC: 104 MMOL/L (ref 98–107)
CHOLEST SERPL-MCNC: 210 MG/DL (ref 0–200)
CO2 SERPL-SCNC: 24.2 MMOL/L (ref 22–29)
CREAT SERPL-MCNC: 0.9 MG/DL (ref 0.57–1)
DEPRECATED RDW RBC AUTO: 40.7 FL (ref 37–54)
EGFRCR SERPLBLD CKD-EPI 2021: 77.1 ML/MIN/1.73
EOSINOPHIL # BLD AUTO: 0.03 10*3/MM3 (ref 0–0.4)
EOSINOPHIL NFR BLD AUTO: 0.4 % (ref 0.3–6.2)
ERYTHROCYTE [DISTWIDTH] IN BLOOD BY AUTOMATED COUNT: 12.7 % (ref 12.3–15.4)
FOLATE SERPL-MCNC: 14 NG/ML (ref 4.78–24.2)
GLOBULIN UR ELPH-MCNC: 1.9 GM/DL
GLUCOSE SERPL-MCNC: 79 MG/DL (ref 65–99)
HCT VFR BLD AUTO: 40.7 % (ref 34–46.6)
HDLC SERPL-MCNC: 85 MG/DL (ref 40–60)
HGB BLD-MCNC: 13.6 G/DL (ref 12–15.9)
IMM GRANULOCYTES # BLD AUTO: 0.01 10*3/MM3 (ref 0–0.05)
IMM GRANULOCYTES NFR BLD AUTO: 0.1 % (ref 0–0.5)
LDLC SERPL CALC-MCNC: 105 MG/DL (ref 0–100)
LDLC/HDLC SERPL: 1.2 {RATIO}
LYMPHOCYTES # BLD AUTO: 1.78 10*3/MM3 (ref 0.7–3.1)
LYMPHOCYTES NFR BLD AUTO: 22.9 % (ref 19.6–45.3)
MCH RBC QN AUTO: 29.6 PG (ref 26.6–33)
MCHC RBC AUTO-ENTMCNC: 33.4 G/DL (ref 31.5–35.7)
MCV RBC AUTO: 88.7 FL (ref 79–97)
MONOCYTES # BLD AUTO: 0.52 10*3/MM3 (ref 0.1–0.9)
MONOCYTES NFR BLD AUTO: 6.7 % (ref 5–12)
NEUTROPHILS NFR BLD AUTO: 5.4 10*3/MM3 (ref 1.7–7)
NEUTROPHILS NFR BLD AUTO: 69.5 % (ref 42.7–76)
NRBC BLD AUTO-RTO: 0 /100 WBC (ref 0–0.2)
PLATELET # BLD AUTO: 290 10*3/MM3 (ref 140–450)
PMV BLD AUTO: 10.3 FL (ref 6–12)
POTASSIUM SERPL-SCNC: 4.6 MMOL/L (ref 3.5–5.2)
PROT SERPL-MCNC: 6.4 G/DL (ref 6–8.5)
RBC # BLD AUTO: 4.59 10*6/MM3 (ref 3.77–5.28)
SODIUM SERPL-SCNC: 137 MMOL/L (ref 136–145)
T4 FREE SERPL-MCNC: 1.42 NG/DL (ref 0.93–1.7)
TRIGL SERPL-MCNC: 116 MG/DL (ref 0–150)
TSH SERPL DL<=0.05 MIU/L-ACNC: 2.44 UIU/ML (ref 0.27–4.2)
VIT B12 BLD-MCNC: 1762 PG/ML (ref 211–946)
VLDLC SERPL-MCNC: 20 MG/DL (ref 5–40)
WBC NRBC COR # BLD: 7.77 10*3/MM3 (ref 3.4–10.8)

## 2023-02-02 PROCEDURE — 80050 GENERAL HEALTH PANEL: CPT

## 2023-02-02 PROCEDURE — 84439 ASSAY OF FREE THYROXINE: CPT

## 2023-02-02 PROCEDURE — 82607 VITAMIN B-12: CPT

## 2023-02-02 PROCEDURE — 82306 VITAMIN D 25 HYDROXY: CPT

## 2023-02-02 PROCEDURE — 80061 LIPID PANEL: CPT

## 2023-02-02 PROCEDURE — 36415 COLL VENOUS BLD VENIPUNCTURE: CPT

## 2023-02-02 PROCEDURE — 82746 ASSAY OF FOLIC ACID SERUM: CPT

## 2023-02-02 RX ORDER — CLINDAMYCIN PHOSPHATE 600 MG/50ML
600 INJECTION INTRAVENOUS EVERY 6 HOURS
Status: DISCONTINUED | OUTPATIENT
Start: 2023-02-03 | End: 2023-02-03

## 2023-02-02 NOTE — H&P
[facility]   HISTORY AND PHYSICAL    Patient Name: Melissa Ruth  : 1970  MRN: [id number]  Primary Care Physician:  Lluvia Perry PA  Date of admission: (Not on file)    Subjective   Subjective     Chief Complaint: Mental menorrhagia    HPI:    Melissa Ruth is a 52 y.o. female  3 para 2 whose had mental menorrhagia.  She declines hormonal therapy.  Endometrial biopsy demonstrates disordered proliferative endometrium.  Pelvic ultrasound demonstrates an endometrial thickness of 1.2 cm.    Review of Systems   Perimenopausal symptoms with abnormal uterine bleeding.    Personal History     Past Medical History:   Diagnosis Date   • Alcoholism ([facility])    • Allergic 20 years ago   • Allergies    • Anxiety    • Arthritis    • Bursitis 2017    PRE PATELLA LEFT   • Chemical dependency ([facility])    • Condition not found     ULCER   • Depression    • Gastrocnemius strain, left 2017   • GERD (gastroesophageal reflux disease)    • Hypothyroidism 2015   • Neuromuscular disorder ([facility]) 2017   • Night sweats    • PONV (postoperative nausea and vomiting)    • Seasonal allergies    • Substance abuse ([facility])    • Thyroid disorder    • Ulcer        Past Surgical History:   Procedure Laterality Date   • COLONOSCOPY     • ESSURE TUBAL LIGATION     • KNEE SURGERY     • TONSILLECTOMY     • UPPER GASTROINTESTINAL ENDOSCOPY  ’s   • WRIST SURGERY         Family History: family history includes Alcohol abuse in her father; Arthritis in her father and mother; Lung cancer in an other family member; Other in an other family member; Stroke in her father. Otherwise pertinent FHx was reviewed and not pertinent to current issue.    Social History:  reports that she quit smoking about 18 years ago. Her smoking use included cigarettes. She started smoking about 39 years ago. She has a 10.00 pack-year smoking history. She has been exposed to tobacco smoke. She has never used  smokeless tobacco. She reports that she does not currently use alcohol. She reports that she does not currently use drugs after having used the following drugs: Marijuana.    Home Medications:  cetirizine, levothyroxine sodium, multivitamin with minerals, and vitamin D3      Allergies:  Allergies   Allergen Reactions   • Penicillins Itching     Positive on allergy skin test        Objective   Objective     Vitals:      Physical Exam    Constitutional: Awake, alert   HENT: NCAT, mucous membranes moist   Neck: Supple, no thyromegaly, no lymphadenopathy, trachea midline   Respiratory: Clear to auscultation bilaterally   Cardiovascular: RRR, no murmurs   Musculoskeletal: No bilateral ankle edema   Psychiatric: Appropriate affect, cooperative   Skin: No rashes    : Uterus is anteverted mobile and nontender with no adnexal masses.  Result Review    Result Review:  I have personally reviewed the results from the time of this admission to 2/2/2023 12:41 EST and agree with these findings:  []  Laboratory  []  Microbiology  [x]  Radiology  []  EKG/Telemetry   []  Cardiology/Vascular   [x]  Pathology  [x]  Old records  []  Other:  Most notable findings include: Ultrasound findings as noted above with a benign endometrial biopsy    Assessment & Plan   Assessment / Plan     Brief Patient Summary:  Melissa Ruth is a 52 y.o. female who has perimenopausal menometrorrhagia    Active Hospital Problems:  Active Hospital Problems    Diagnosis    • **Excessive bleeding in premenopausal period        Plan:   Hysteroscopy with a D&C and endometrial ablation.    The risk benefits alternatives and potential complications of this procedure have been discussed at length with the patient and she wishes to proceed as discussed.    DVT prophylaxis:  No DVT prophylaxis order currently exists.        Electronically signed by Harshad Medrano MD, 02/02/23, 12:41 PM EST.

## 2023-02-03 ENCOUNTER — ANESTHESIA (OUTPATIENT)
Dept: PERIOP | Facility: HOSPITAL | Age: 53
End: 2023-02-03
Payer: COMMERCIAL

## 2023-02-03 ENCOUNTER — HOSPITAL ENCOUNTER (OUTPATIENT)
Facility: HOSPITAL | Age: 53
Setting detail: HOSPITAL OUTPATIENT SURGERY
Discharge: HOME | End: 2023-02-03
Attending: OBSTETRICS & GYNECOLOGY | Admitting: OBSTETRICS & GYNECOLOGY
Payer: COMMERCIAL

## 2023-02-03 ENCOUNTER — ANESTHESIA EVENT (OUTPATIENT)
Dept: PERIOP | Facility: HOSPITAL | Age: 53
End: 2023-02-03
Payer: COMMERCIAL

## 2023-02-03 VITALS
RESPIRATION RATE: 20 BRPM | SYSTOLIC BLOOD PRESSURE: 109 MMHG | HEIGHT: 65 IN | OXYGEN SATURATION: 100 % | DIASTOLIC BLOOD PRESSURE: 68 MMHG | BODY MASS INDEX: 22.22 KG/M2 | TEMPERATURE: 96.9 F | WEIGHT: 133.38 LBS | HEART RATE: 58 BPM

## 2023-02-03 DIAGNOSIS — N92.4 EXCESSIVE BLEEDING IN PREMENOPAUSAL PERIOD: ICD-10-CM

## 2023-02-03 PROCEDURE — 25010000002 PROPOFOL 10 MG/ML EMULSION: Performed by: NURSE ANESTHETIST, CERTIFIED REGISTERED

## 2023-02-03 PROCEDURE — 0 LIDOCAINE 1 % SOLUTION: Performed by: OBSTETRICS & GYNECOLOGY

## 2023-02-03 PROCEDURE — 25010000002 FENTANYL CITRATE (PF) 50 MCG/ML SOLUTION: Performed by: NURSE ANESTHETIST, CERTIFIED REGISTERED

## 2023-02-03 PROCEDURE — 25010000002 DEXAMETHASONE PER 1 MG: Performed by: NURSE ANESTHETIST, CERTIFIED REGISTERED

## 2023-02-03 PROCEDURE — 25010000002 MIDAZOLAM PER 1 MG: Performed by: ANESTHESIOLOGY

## 2023-02-03 PROCEDURE — 88305 TISSUE EXAM BY PATHOLOGIST: CPT | Performed by: OBSTETRICS & GYNECOLOGY

## 2023-02-03 PROCEDURE — 25010000002 ONDANSETRON PER 1 MG: Performed by: NURSE ANESTHETIST, CERTIFIED REGISTERED

## 2023-02-03 PROCEDURE — 25010000002 KETOROLAC TROMETHAMINE PER 15 MG: Performed by: NURSE ANESTHETIST, CERTIFIED REGISTERED

## 2023-02-03 RX ORDER — ACETAMINOPHEN 500 MG
1000 TABLET ORAL ONCE
Status: COMPLETED | OUTPATIENT
Start: 2023-02-03 | End: 2023-02-03

## 2023-02-03 RX ORDER — LIDOCAINE HYDROCHLORIDE 10 MG/ML
INJECTION, SOLUTION INFILTRATION; PERINEURAL AS NEEDED
Status: DISCONTINUED | OUTPATIENT
Start: 2023-02-03 | End: 2023-02-03 | Stop reason: HOSPADM

## 2023-02-03 RX ORDER — CEFAZOLIN SODIUM 1 G/50ML
1 INJECTION, SOLUTION INTRAVENOUS ONCE
Status: DISCONTINUED | OUTPATIENT
Start: 2023-02-03 | End: 2023-02-03 | Stop reason: DRUGHIGH

## 2023-02-03 RX ORDER — KETOROLAC TROMETHAMINE 30 MG/ML
INJECTION, SOLUTION INTRAMUSCULAR; INTRAVENOUS AS NEEDED
Status: DISCONTINUED | OUTPATIENT
Start: 2023-02-03 | End: 2023-02-03 | Stop reason: SURG

## 2023-02-03 RX ORDER — ONDANSETRON 2 MG/ML
INJECTION INTRAMUSCULAR; INTRAVENOUS AS NEEDED
Status: DISCONTINUED | OUTPATIENT
Start: 2023-02-03 | End: 2023-02-03 | Stop reason: SURG

## 2023-02-03 RX ORDER — PROPOFOL 10 MG/ML
VIAL (ML) INTRAVENOUS AS NEEDED
Status: DISCONTINUED | OUTPATIENT
Start: 2023-02-03 | End: 2023-02-03 | Stop reason: SURG

## 2023-02-03 RX ORDER — CLINDAMYCIN PHOSPHATE 600 MG/50ML
600 INJECTION INTRAVENOUS ONCE
Status: COMPLETED | OUTPATIENT
Start: 2023-02-03 | End: 2023-02-03

## 2023-02-03 RX ORDER — MIDAZOLAM HYDROCHLORIDE 1 MG/ML
2 INJECTION INTRAMUSCULAR; INTRAVENOUS ONCE
Status: COMPLETED | OUTPATIENT
Start: 2023-02-03 | End: 2023-02-03

## 2023-02-03 RX ORDER — SODIUM CHLORIDE 0.9 % (FLUSH) 0.9 %
1-10 SYRINGE (ML) INJECTION AS NEEDED
Status: DISCONTINUED | OUTPATIENT
Start: 2023-02-03 | End: 2023-02-03 | Stop reason: HOSPADM

## 2023-02-03 RX ORDER — OXYCODONE HYDROCHLORIDE 5 MG/1
5 TABLET ORAL
Status: DISCONTINUED | OUTPATIENT
Start: 2023-02-03 | End: 2023-02-03 | Stop reason: HOSPADM

## 2023-02-03 RX ORDER — FENTANYL CITRATE 50 UG/ML
INJECTION, SOLUTION INTRAMUSCULAR; INTRAVENOUS AS NEEDED
Status: DISCONTINUED | OUTPATIENT
Start: 2023-02-03 | End: 2023-02-03 | Stop reason: SURG

## 2023-02-03 RX ORDER — SODIUM CHLORIDE 0.9 % (FLUSH) 0.9 %
3 SYRINGE (ML) INJECTION EVERY 12 HOURS SCHEDULED
Status: DISCONTINUED | OUTPATIENT
Start: 2023-02-03 | End: 2023-02-03 | Stop reason: HOSPADM

## 2023-02-03 RX ORDER — SODIUM CHLORIDE 9 MG/ML
40 INJECTION, SOLUTION INTRAVENOUS AS NEEDED
Status: DISCONTINUED | OUTPATIENT
Start: 2023-02-03 | End: 2023-02-03 | Stop reason: HOSPADM

## 2023-02-03 RX ORDER — MAGNESIUM HYDROXIDE 1200 MG/15ML
LIQUID ORAL AS NEEDED
Status: DISCONTINUED | OUTPATIENT
Start: 2023-02-03 | End: 2023-02-03 | Stop reason: HOSPADM

## 2023-02-03 RX ORDER — PROMETHAZINE HYDROCHLORIDE 25 MG/1
25 SUPPOSITORY RECTAL ONCE AS NEEDED
Status: DISCONTINUED | OUTPATIENT
Start: 2023-02-03 | End: 2023-02-03 | Stop reason: HOSPADM

## 2023-02-03 RX ORDER — PROMETHAZINE HYDROCHLORIDE 12.5 MG/1
25 TABLET ORAL ONCE AS NEEDED
Status: DISCONTINUED | OUTPATIENT
Start: 2023-02-03 | End: 2023-02-03 | Stop reason: HOSPADM

## 2023-02-03 RX ORDER — ONDANSETRON 2 MG/ML
4 INJECTION INTRAMUSCULAR; INTRAVENOUS ONCE AS NEEDED
Status: DISCONTINUED | OUTPATIENT
Start: 2023-02-03 | End: 2023-02-03 | Stop reason: HOSPADM

## 2023-02-03 RX ORDER — DEXAMETHASONE SODIUM PHOSPHATE 4 MG/ML
INJECTION, SOLUTION INTRA-ARTICULAR; INTRALESIONAL; INTRAMUSCULAR; INTRAVENOUS; SOFT TISSUE AS NEEDED
Status: DISCONTINUED | OUTPATIENT
Start: 2023-02-03 | End: 2023-02-03 | Stop reason: SURG

## 2023-02-03 RX ORDER — SODIUM CHLORIDE, SODIUM LACTATE, POTASSIUM CHLORIDE, CALCIUM CHLORIDE 600; 310; 30; 20 MG/100ML; MG/100ML; MG/100ML; MG/100ML
9 INJECTION, SOLUTION INTRAVENOUS CONTINUOUS PRN
Status: DISCONTINUED | OUTPATIENT
Start: 2023-02-03 | End: 2023-02-03 | Stop reason: HOSPADM

## 2023-02-03 RX ORDER — MEPERIDINE HYDROCHLORIDE 25 MG/ML
12.5 INJECTION INTRAMUSCULAR; INTRAVENOUS; SUBCUTANEOUS
Status: DISCONTINUED | OUTPATIENT
Start: 2023-02-03 | End: 2023-02-03 | Stop reason: HOSPADM

## 2023-02-03 RX ORDER — LIDOCAINE HYDROCHLORIDE 20 MG/ML
INJECTION, SOLUTION EPIDURAL; INFILTRATION; INTRACAUDAL; PERINEURAL AS NEEDED
Status: DISCONTINUED | OUTPATIENT
Start: 2023-02-03 | End: 2023-02-03 | Stop reason: SURG

## 2023-02-03 RX ADMIN — CLINDAMYCIN IN 5 PERCENT DEXTROSE 600 MG: 12 INJECTION, SOLUTION INTRAVENOUS at 07:21

## 2023-02-03 RX ADMIN — MIDAZOLAM HYDROCHLORIDE 2 MG: 1 INJECTION, SOLUTION INTRAMUSCULAR; INTRAVENOUS at 07:08

## 2023-02-03 RX ADMIN — ONDANSETRON 4 MG: 2 INJECTION INTRAMUSCULAR; INTRAVENOUS at 07:27

## 2023-02-03 RX ADMIN — KETOROLAC TROMETHAMINE 30 MG: 30 INJECTION, SOLUTION INTRAMUSCULAR; INTRAVENOUS at 07:27

## 2023-02-03 RX ADMIN — DEXAMETHASONE SODIUM PHOSPHATE 8 MG: 4 INJECTION, SOLUTION INTRA-ARTICULAR; INTRALESIONAL; INTRAMUSCULAR; INTRAVENOUS; SOFT TISSUE at 07:27

## 2023-02-03 RX ADMIN — SODIUM CHLORIDE, POTASSIUM CHLORIDE, SODIUM LACTATE AND CALCIUM CHLORIDE 9 ML/HR: 600; 310; 30; 20 INJECTION, SOLUTION INTRAVENOUS at 06:55

## 2023-02-03 RX ADMIN — PROPOFOL 150 MG: 10 INJECTION, EMULSION INTRAVENOUS at 07:24

## 2023-02-03 RX ADMIN — SODIUM CHLORIDE, POTASSIUM CHLORIDE, SODIUM LACTATE AND CALCIUM CHLORIDE 9 ML/HR: 600; 310; 30; 20 INJECTION, SOLUTION INTRAVENOUS at 08:32

## 2023-02-03 RX ADMIN — ACETAMINOPHEN 1000 MG: 500 TABLET ORAL at 06:56

## 2023-02-03 RX ADMIN — LIDOCAINE HYDROCHLORIDE 50 MG: 20 INJECTION, SOLUTION EPIDURAL; INFILTRATION; INTRACAUDAL; PERINEURAL at 07:24

## 2023-02-03 RX ADMIN — FENTANYL CITRATE 25 MCG: 50 INJECTION, SOLUTION INTRAMUSCULAR; INTRAVENOUS at 07:40

## 2023-02-03 NOTE — DISCHARGE INSTRUCTIONS
DISCHARGE INSTRUCTIONS  GYNECOLOGICAL  PROCEDURES      For your surgery you had:  Monitored anesthesia care  You may experience dizziness, drowsiness, or lightheadedness for several hours following surgery.  Do not stay alone today or tonight.  Limit your activity for 24 hours.  Resume your diet slowly.  Follow any special dietary instructions you may have been given by your doctor.  You should not drive or operate machinery, drink alcohol, or sign legally binding documents for 24 hours or while you are taking pain medication.    NOTIFY YOUR DOCTOR IF YOU EXPERIENCE ANY OF THE FOLLOWING:  Temperature greater than 101 degrees Fahrenheit  Shaking Chills  Redness or excessive drainage from incision  Nausea, vomiting and/or pain that is not controlled by prescribed medications  Increase in bleeding or bleeding that is excessive  Unable to urinate in 6 hours after surgery  If unable to reach your doctor, please go to the closest Emergency Room [x] You may resume intercourse and the use of tampons as your physician has instructed you.  [x] Vaginal bleeding may be expected for several days with flow decreasing with time and never any heavier than a normal period.  If you have an ablation, vaginal discharge is expected after bleeding stops.   If you have foul smelling discharge, notify your physician.  Medications per physician instructions as indicated on Discharge Medication Information Sheet.      SPECIAL INSTRUCTIONS:  Follow verbal instructions given by Dr. Medrano.  Follow up appointment  feb. 17 at 11:30    Last dose of pain medication was given at:  Tylenol (1000mg) last at 7am. Do not exceed 4000mg of tylenol in a 24 hour period. May take tylenol next at 11am if needed.  Toradol last at 7:30am. May take ibuprofen next at 1:30pm if able and needed.

## 2023-02-03 NOTE — ANESTHESIA POSTPROCEDURE EVALUATION
Patient: Melissa Ruth    Procedure Summary     Date: 02/03/23 Room / Location: [facility] OR 01 / [facility] MAIN OR    Anesthesia Start: 0718 Anesthesia Stop: 0759    Procedure: DILATATION AND CURETTAGE HYSTEROSCOPY NOVASURE ENDOMETRIAL ABLATION (Vagina) Diagnosis:       Excessive bleeding in premenopausal period      (Excessive bleeding in premenopausal period [N92.4])    Surgeons: Harshad Medrano MD Provider: Alexis Greenfield MD    Anesthesia Type: general ASA Status: 2          Anesthesia Type: general    Vitals  Vitals Value Taken Time   /76 02/03/23 0829   Temp 36.6 °C (97.8 °F) 02/03/23 0825   Pulse 57 02/03/23 0829   Resp 16 02/03/23 0825   SpO2 100 % 02/03/23 0829   Vitals shown include unvalidated device data.        Post Anesthesia Care and Evaluation    Patient location during evaluation: bedside  Patient participation: complete - patient participated  Level of consciousness: awake  Pain score: 2  Pain management: adequate    Airway patency: patent  Anesthetic complications: No anesthetic complications  PONV Status: none  Cardiovascular status: acceptable and stable  Respiratory status: acceptable  Hydration status: acceptable    Comments: An Anesthesiologist personally participated in the most demanding procedures (including induction and emergence if applicable) in the anesthesia plan, monitored the course of anesthesia administration at frequent intervals and remained physically present and available for immediate diagnosis and treatment of emergencies.

## 2023-02-03 NOTE — OP NOTE
DILATATION AND CURETTAGE HYSTEROSCOPY NOVASURE ENDOMETRIAL ABLATION  Procedure Report    Patient Name:  Melissa Ruth  YOB: 1970    Date of Surgery:  2/3/2023     Indications: Menorrhagia    Pre-op Diagnosis:   Excessive bleeding in premenopausal period [N92.4]    Post-op Diagnosis     Post-Op Diagnosis Codes:     * Excessive bleeding in premenopausal period [N92.4]    Anesthesia: LMA    Estimated Blood Loss: 3 mL    Specimen:          Specimens     ID Source Type Tests Collected By Collected At Frozen?    A Endometrial Curettings Tissue · TISSUE PATHOLOGY EXAM   Harshad Medrano MD 2/3/23 0703 No    Description: Endometrial Curettings              Findings: Moderate amount of endometrial tissue    Complications: None    Description of Procedure: The patient was taken to the operating room where she underwent excellent anesthetic relaxation.  Exam under anesthesia revealed a mildly anteverted uterus.  She was prepped and draped in usual manner.  A weighted speculum was placed and the anterior cervix was grasped with a single tooth tenaculum.  A paracervical block utilizing 10 ml of 1 percent lidocaine was established.  The cervix was then dilated and the uterus sounded to 9  centimeters and the cervical length was 4 cm that gave us a cavity length of 5 cm.  Initial hysteroscopy was performed.  A sharp curettage was performed removing the endometrial tissue.  Repeat hysteroscopy noted good clearing of the endometrial cavity.  The NovaSure device was placed yielding a cavity width of 3.8 centimeters.  The ablation was then carried out on a power of 105 W for 1 minute and 28 seconds.  Repeat hysteroscopy noted good blanching and charring of the endometrial tissue.  Instrumentation was removed, hemostasis was assured, and the case was terminated. All counts were correct and there were no complications.  The patient is going to the recovery room in stable condition.            Harshad Parks  MD Marcela     Date: 2/3/2023  Time: 08:02 EST

## 2023-02-03 NOTE — ANESTHESIA PREPROCEDURE EVALUATION
Anesthesia Evaluation     Patient summary reviewed and Nursing notes reviewed                Airway   Mallampati: I  TM distance: >3 FB  Neck ROM: full  No difficulty expected  Dental      Pulmonary - negative pulmonary ROS and normal exam    breath sounds clear to auscultation  Cardiovascular - negative cardio ROS and normal exam    Rhythm: regular  Rate: normal        Neuro/Psych  (+) numbness, psychiatric history Anxiety and Depression,    GI/Hepatic/Renal/Endo    (+)  GERD, PUD,  thyroid problem hypothyroidism    Musculoskeletal     Abdominal    Substance History - negative use     OB/GYN negative ob/gyn ROS         Other   arthritis,                      Anesthesia Plan    ASA 2     general     intravenous induction     Anesthetic plan, risks, benefits, and alternatives have been provided, discussed and informed consent has been obtained with: patient.        CODE STATUS:

## 2023-03-10 ENCOUNTER — HOSPITAL ENCOUNTER (OUTPATIENT)
Dept: MAMMOGRAPHY | Facility: HOSPITAL | Age: 53
Discharge: HOME | End: 2023-03-10
Admitting: OBSTETRICS & GYNECOLOGY
Payer: COMMERCIAL

## 2023-03-10 DIAGNOSIS — Z12.31 SCREENING MAMMOGRAM, ENCOUNTER FOR: ICD-10-CM

## 2023-03-10 PROCEDURE — 77067 SCR MAMMO BI INCL CAD: CPT

## 2023-03-10 PROCEDURE — 77063 BREAST TOMOSYNTHESIS BI: CPT

## 2023-03-14 ENCOUNTER — TRANSCRIBE ORDERS (OUTPATIENT)
Dept: ADMINISTRATIVE | Facility: HOSPITAL | Age: 53
End: 2023-03-14
Payer: COMMERCIAL

## 2023-03-14 DIAGNOSIS — R92.1 CALCIFICATION OF BREAST: ICD-10-CM

## 2023-03-14 DIAGNOSIS — H35.029: Primary | ICD-10-CM

## 2023-03-14 DIAGNOSIS — R92.1 CALCIFICATION OF RIGHT BREAST: Primary | ICD-10-CM

## 2023-03-30 ENCOUNTER — HOSPITAL ENCOUNTER (OUTPATIENT)
Dept: MAMMOGRAPHY | Facility: HOSPITAL | Age: 53
Discharge: HOME | End: 2023-03-30
Payer: COMMERCIAL

## 2023-03-30 ENCOUNTER — HOSPITAL ENCOUNTER (OUTPATIENT)
Dept: ULTRASOUND IMAGING | Facility: HOSPITAL | Age: 53
Discharge: HOME | End: 2023-03-30
Payer: COMMERCIAL

## 2023-03-30 DIAGNOSIS — R92.1 CALCIFICATION OF BREAST: ICD-10-CM

## 2023-03-30 DIAGNOSIS — R92.1 CALCIFICATION OF RIGHT BREAST: ICD-10-CM

## 2023-03-30 PROCEDURE — 76642 ULTRASOUND BREAST LIMITED: CPT

## 2023-07-20 NOTE — PROGRESS NOTES
Chief Complaint  Chief Complaint   Patient presents with    Follow-up     6 month    Hypothyroidism       Subjective          Melissa Ruth presents to [facility] for 6 month follow up.     History of Present Illness    Hypothyroidism: Patient is currently on Tirosint 88 MCG and doing well. Patient states energy is good. Patient denies weight changes, bowel changes and changes in hair, skin and nails.     Vitamin D deficiency: Patient is currently on Vitamin D3 125 MCG for vitamin D deficiency. Patient's levels have been checked 6mths ago and were normal. Patient denies any fatigue and muscle cramping.    Mammo: ordered 3/30/23; followed by Dr. Medrano  Recently had ablation; no longer having periods.  Colon: 11.30.20    Medical History: has a past medical history of Alcoholism, Allergic (20 years ago), Allergies, Anxiety, Arthritis, Bursitis (09/05/2017), Chemical dependency, Depression, Gastrocnemius strain, left (09/05/2017), GERD (gastroesophageal reflux disease) (2010), Hypothyroidism (08/18/2015), Neuromuscular disorder (09/05/2017), Night sweats, PONV (postoperative nausea and vomiting), Seasonal allergies, Substance abuse (1994), Thyroid disorder, and Ulcer (2010).   Surgical History: has a past surgical history that includes Colonoscopy; Knee surgery (Bilateral, 1997); Tonsillectomy; Wrist surgery (Right, 2014); Upper gastrointestinal endoscopy (2000’s); Essure tubal ligation; and d & c hysteroscopy endometrial ablation (N/A, 2/3/2023).   Family History: family history includes Alcohol abuse in her father; Arthritis in her father and mother; Lung cancer in an other family member; Other in an other family member; Stroke in her father.   Social History: reports that she quit smoking about 19 years ago. Her smoking use included cigarettes. She started smoking about 39 years ago. She has a 10.00 pack-year smoking history. She has been exposed to tobacco smoke. She has never  "used smokeless tobacco. She reports that she does not currently use alcohol. She reports that she does not currently use drugs after having used the following drugs: Marijuana.    Allergies: Penicillins    Health Maintenance Due   Topic Date Due    ANNUAL PHYSICAL  Never done       Immunization History   Administered Date(s) Administered    Fluzone Quad >6mos (Multi-dose) 11/30/2020    Hepatitis A 04/24/2018    Influenza LAIV ([facility]) 10/16/2012    Influenza, Unspecified 10/08/2013, 11/04/2014, 10/09/2015, 11/30/2020       Objective     Vitals:    07/24/23 1016   BP: 108/70   BP Location: Right arm   Patient Position: Sitting   Cuff Size: Adult   Pulse: 67   Resp: 18   SpO2: 97%   Weight: 59.2 kg (130 lb 9.6 oz)   Height: 165.1 cm (65\")     Body mass index is 21.73 kg/m².     Wt Readings from Last 3 Encounters:   07/24/23 59.2 kg (130 lb 9.6 oz)   02/03/23 60.5 kg (133 lb 6.1 oz)   01/30/23 60.9 kg (134 lb 3.2 oz)     BP Readings from Last 3 Encounters:   07/24/23 108/70   02/03/23 109/68   01/30/23 111/79       BMI is within normal parameters. No other follow-up for BMI required.      Patient Care Team:  Lluvia Perry PA as PCP - General (Family Medicine)  Ashley Buck APRN as Nurse Practitioner (Nurse Practitioner)  Huan Caro MD as Consulting Physician (Gastroenterology)  Tracey Smart APRN as Nurse Practitioner (Gastroenterology)    Physical Exam  Vitals and nursing note reviewed.   Constitutional:       Appearance: Normal appearance.   HENT:      Head: Normocephalic and atraumatic.   Neck:      Vascular: No carotid bruit.      Comments: Thyroid : gland size normal, nontender, no nodules or masses present on palpation   Cardiovascular:      Rate and Rhythm: Normal rate and regular rhythm.      Pulses: Normal pulses.      Heart sounds: Normal heart sounds.   Pulmonary:      Effort: Pulmonary effort is normal.      Breath sounds: Normal breath sounds.   Musculoskeletal:      Cervical " back: Neck supple. No tenderness.   Lymphadenopathy:      Cervical: No cervical adenopathy.   Neurological:      Mental Status: She is alert.   Psychiatric:         Mood and Affect: Mood normal.         Behavior: Behavior normal.         Result Review :                           Assessment and Plan      Diagnoses and all orders for this visit:    1. Acquired hypothyroidism (Primary)  Comments:  Stable; continue with Tirosint 88mcg daily; check labs and f/u in 6mths.  Orders:  -     Tirosint 88 MCG capsule; Take 1 capsule by mouth Daily.  Dispense: 90 capsule; Refill: 1  -     TSH+Free T4; Future    2. Vitamin D deficiency  Comments:  Stable: Labs ordered today, refill sent  Orders:  -     vitamin D3 125 MCG (5000 UT) capsule capsule; Take 1 capsule by mouth Daily.  Dispense: 90 capsule; Refill: 1  -     Vitamin D,25-Hydroxy; Future    3. Herpes labialis  Comments:  Not currently with outbreak but needs refills for when an outbreak were to occur.  Orders:  -     valACYclovir (Valtrex) 1000 MG tablet; Take 2 tablets by mouth 2 (Two) Times a Day.  Dispense: 4 tablet; Refill: 5            Follow Up     Return in about 6 months (around 1/24/2024).    Patient was given instructions and counseling regarding her condition or for health maintenance advice. Please see specific information pulled into the AVS if appropriate.

## 2023-07-24 ENCOUNTER — LAB (OUTPATIENT)
Dept: LAB | Facility: HOSPITAL | Age: 53
End: 2023-07-24
Payer: COMMERCIAL

## 2023-07-24 ENCOUNTER — OFFICE VISIT (OUTPATIENT)
Dept: FAMILY MEDICINE CLINIC | Facility: CLINIC | Age: 53
End: 2023-07-24
Payer: COMMERCIAL

## 2023-07-24 VITALS
BODY MASS INDEX: 21.76 KG/M2 | SYSTOLIC BLOOD PRESSURE: 108 MMHG | HEIGHT: 65 IN | RESPIRATION RATE: 18 BRPM | OXYGEN SATURATION: 97 % | DIASTOLIC BLOOD PRESSURE: 70 MMHG | WEIGHT: 130.6 LBS | HEART RATE: 67 BPM

## 2023-07-24 DIAGNOSIS — E55.9 VITAMIN D DEFICIENCY: Chronic | ICD-10-CM

## 2023-07-24 DIAGNOSIS — B00.1 HERPES LABIALIS: ICD-10-CM

## 2023-07-24 DIAGNOSIS — E03.9 ACQUIRED HYPOTHYROIDISM: Primary | Chronic | ICD-10-CM

## 2023-07-24 DIAGNOSIS — E03.9 ACQUIRED HYPOTHYROIDISM: Chronic | ICD-10-CM

## 2023-07-24 LAB
25(OH)D3 SERPL-MCNC: 73.5 NG/ML (ref 30–100)
T4 FREE SERPL-MCNC: 1.38 NG/DL (ref 0.93–1.7)
TSH SERPL DL<=0.05 MIU/L-ACNC: 1.79 UIU/ML (ref 0.27–4.2)

## 2023-07-24 PROCEDURE — 84439 ASSAY OF FREE THYROXINE: CPT

## 2023-07-24 PROCEDURE — 36415 COLL VENOUS BLD VENIPUNCTURE: CPT

## 2023-07-24 PROCEDURE — 84443 ASSAY THYROID STIM HORMONE: CPT

## 2023-07-24 PROCEDURE — 82306 VITAMIN D 25 HYDROXY: CPT

## 2023-07-24 PROCEDURE — 99214 OFFICE O/P EST MOD 30 MIN: CPT | Performed by: PHYSICIAN ASSISTANT

## 2023-07-24 RX ORDER — LEVOTHYROXINE SODIUM 88 UG/1
88 CAPSULE ORAL DAILY
Qty: 90 CAPSULE | Refills: 1 | Status: SHIPPED | OUTPATIENT
Start: 2023-07-24

## 2023-07-24 RX ORDER — VALACYCLOVIR HYDROCHLORIDE 1 G/1
2000 TABLET, FILM COATED ORAL 2 TIMES DAILY
Qty: 4 TABLET | Refills: 5 | Status: SHIPPED | OUTPATIENT
Start: 2023-07-24

## 2023-11-27 DIAGNOSIS — E03.9 ACQUIRED HYPOTHYROIDISM: Chronic | ICD-10-CM

## 2023-11-27 RX ORDER — LEVOTHYROXINE SODIUM 88 UG/1
88 CAPSULE ORAL DAILY
Qty: 90 CAPSULE | Refills: 1 | Status: CANCELLED | OUTPATIENT
Start: 2023-11-27

## 2023-11-27 NOTE — TELEPHONE ENCOUNTER
Incoming Refill Request      Medication requested (name and dose): Tirosint 88 MCG capsule     Pharmacy where request should be sent: [facility]     Additional details provided by patient: Will be out of medication tomorrow - next ofc visit schedule for 01/15/24    Best call back number: [phone]    Does the patient have less than a 3 day supply:  [x] Yes  [] No    Go Layne Rep  11/27/23, 10:44 EST

## 2023-11-27 NOTE — TELEPHONE ENCOUNTER
Spoke w/Brandy at [facility] and she advised there is a refill remaining and apologizes - she states she will contact the pt.

## 2024-01-14 NOTE — PROGRESS NOTES
Chief Complaint  Chief Complaint   Patient presents with    Follow-up     Pt is aware of vaccines needed to completed.     Hypothyroidism    Vitamin D Deficiency       Subjective          Melissa Ruth presents to [facility] for 6mth follow-up and annual labs.  History of Present Illness  Hypothyroidism: Patient is currently on Tirosint 88 MCG and doing well. Patient states energy is good. Patient denies weight changes, bowel changes and changes in hair, skin and nails.      Vitamin D deficiency: Patient is currently on Vitamin D3 125 MCG for vitamin D deficiency. Patient's levels have been checked 6mths ago and were normal. Patient denies any fatigue and muscle cramping.     Mammo: ordered 3/30/23; followed by Dr. Medrano; additional imaging recommended; benign cyst  Recently had ablation; no longer having periods.  Colon: 11.30.20    Medical History: has a past medical history of Alcoholism, Allergic (20 years ago), Allergies, Anxiety, Arthritis, Bursitis (09/05/2017), Chemical dependency, Depression, Gastrocnemius strain, left (09/05/2017), GERD (gastroesophageal reflux disease) (2010), Hypothyroidism (08/18/2015), Neuromuscular disorder (09/05/2017), Night sweats, PONV (postoperative nausea and vomiting), Seasonal allergies, Substance abuse (1994), Thyroid disorder, and Ulcer (2010).   Surgical History: has a past surgical history that includes Colonoscopy; Knee surgery (Bilateral, 1997); Tonsillectomy; Wrist surgery (Right, 2014); Upper gastrointestinal endoscopy (2000’s); Essure tubal ligation; and d & c hysteroscopy endometrial ablation (N/A, 2/3/2023).   Family History: family history includes Alcohol abuse in her father; Arthritis in her father and mother; Lung cancer in an other family member; Other in an other family member; Stroke in her father.   Social History: reports that she quit smoking about 19 years ago. Her smoking use included cigarettes. She started smoking  "about 39 years ago. She has a 10.00 pack-year smoking history. She has been exposed to tobacco smoke. She has never used smokeless tobacco. She reports that she does not currently use alcohol. She reports that she does not currently use drugs after having used the following drugs: Marijuana.    Allergies: Penicillins    Health Maintenance Due   Topic Date Due    TDAP/TD VACCINES (1 - Tdap) Never done    ANNUAL PHYSICAL  Never done    INFLUENZA VACCINE  08/01/2023    PAP SMEAR  12/08/2023       Immunization History   Administered Date(s) Administered    Fluzone Quad >6mos (Multi-dose) 11/30/2020    Hepatitis A 04/24/2018    Influenza LAIV (Nasal) 10/16/2012    Influenza, Unspecified 10/08/2013, 11/04/2014, 10/09/2015, 11/30/2020       Objective     Vitals:    01/15/24 1003   BP: 114/83   Pulse: 70   SpO2: 98%   Weight: 59.2 kg (130 lb 9.6 oz)   Height: 165.1 cm (65\")     Body mass index is 21.73 kg/m².     Wt Readings from Last 3 Encounters:   01/15/24 59.2 kg (130 lb 9.6 oz)   07/24/23 59.2 kg (130 lb 9.6 oz)   02/03/23 60.5 kg (133 lb 6.1 oz)     BP Readings from Last 3 Encounters:   01/15/24 114/83   07/24/23 108/70   02/03/23 109/68       BMI is within normal parameters. No other follow-up for BMI required.      Patient Care Team:  Lluvia Perry PA as PCP - General (Family Medicine)  Ashley Buck, SONNY as Nurse Practitioner (Nurse Practitioner)  Huan Caro MD as Consulting Physician (Gastroenterology)  Tracey Smart APRN as Nurse Practitioner (Gastroenterology)    Physical Exam  Vitals and nursing note reviewed.   Constitutional:       Appearance: Normal appearance.   HENT:      Head: Normocephalic and atraumatic.   Neck:      Vascular: No carotid bruit.      Comments: Thyroid : gland size normal, nontender, no nodules or masses present on palpation   Cardiovascular:      Rate and Rhythm: Normal rate and regular rhythm.      Pulses: Normal pulses.      Heart sounds: Normal heart " sounds.   Pulmonary:      Effort: Pulmonary effort is normal.      Breath sounds: Normal breath sounds.   Musculoskeletal:      Cervical back: Neck supple. No tenderness.   Lymphadenopathy:      Cervical: No cervical adenopathy.   Neurological:      Mental Status: She is alert.   Psychiatric:         Mood and Affect: Mood normal.         Behavior: Behavior normal.           Result Review :                           Assessment and Plan      Diagnoses and all orders for this visit:    1. Annual physical exam (Primary)  -     Comprehensive Metabolic Panel; Future  -     Lipid Panel; Future  -     CBC & Differential; Future  -     TSH; Future  -     T4, Free; Future    2. Vitamin D deficiency  -     Vitamin D,25-Hydroxy; Future    3. B12 deficiency  -     Vitamin B12; Future  -     Folate; Future    4. Acquired hypothyroidism  Comments:  Stable; continue with Tirosint 88mcg daily; check labs and f/u in 6mths.  Orders:  -     TSH; Future  -     T4, Free; Future  -     Tirosint 88 MCG capsule; Take 1 capsule by mouth Daily.  Dispense: 90 capsule; Refill: 1    The patient is advised to continue current medications, continue current healthy lifestyle patterns, and return for routine annual checkups.        Follow Up     Return in about 6 months (around 7/15/2024).    Patient was given instructions and counseling regarding her condition or for health maintenance advice. Please see specific information pulled into the AVS if appropriate.

## 2024-01-15 ENCOUNTER — LAB (OUTPATIENT)
Dept: LAB | Facility: HOSPITAL | Age: 54
End: 2024-01-15
Payer: COMMERCIAL

## 2024-01-15 ENCOUNTER — OFFICE VISIT (OUTPATIENT)
Dept: FAMILY MEDICINE CLINIC | Facility: CLINIC | Age: 54
End: 2024-01-15
Payer: COMMERCIAL

## 2024-01-15 VITALS
SYSTOLIC BLOOD PRESSURE: 114 MMHG | DIASTOLIC BLOOD PRESSURE: 83 MMHG | WEIGHT: 130.6 LBS | BODY MASS INDEX: 21.76 KG/M2 | HEART RATE: 70 BPM | HEIGHT: 65 IN | OXYGEN SATURATION: 98 %

## 2024-01-15 DIAGNOSIS — E03.9 ACQUIRED HYPOTHYROIDISM: Chronic | ICD-10-CM

## 2024-01-15 DIAGNOSIS — E55.9 VITAMIN D DEFICIENCY: ICD-10-CM

## 2024-01-15 DIAGNOSIS — E53.8 B12 DEFICIENCY: ICD-10-CM

## 2024-01-15 DIAGNOSIS — Z00.00 ANNUAL PHYSICAL EXAM: Primary | ICD-10-CM

## 2024-01-15 DIAGNOSIS — Z00.00 ANNUAL PHYSICAL EXAM: ICD-10-CM

## 2024-01-15 LAB
25(OH)D3 SERPL-MCNC: 55.7 NG/ML (ref 30–100)
ALBUMIN SERPL-MCNC: 4.9 G/DL (ref 3.5–5.2)
ALBUMIN/GLOB SERPL: 2.1 G/DL
ALP SERPL-CCNC: 82 U/L (ref 39–117)
ALT SERPL W P-5'-P-CCNC: 35 U/L (ref 1–33)
ANION GAP SERPL CALCULATED.3IONS-SCNC: 10 MMOL/L (ref 5–15)
AST SERPL-CCNC: 32 U/L (ref 1–32)
BASOPHILS # BLD AUTO: 0.04 10*3/MM3 (ref 0–0.2)
BASOPHILS NFR BLD AUTO: 0.5 % (ref 0–1.5)
BILIRUB SERPL-MCNC: 0.6 MG/DL (ref 0–1.2)
BUN SERPL-MCNC: 10 MG/DL (ref 6–20)
BUN/CREAT SERPL: 10.9 (ref 7–25)
CALCIUM SPEC-SCNC: 9.8 MG/DL (ref 8.6–10.5)
CHLORIDE SERPL-SCNC: 102 MMOL/L (ref 98–107)
CHOLEST SERPL-MCNC: 253 MG/DL (ref 0–200)
CO2 SERPL-SCNC: 25 MMOL/L (ref 22–29)
CREAT SERPL-MCNC: 0.92 MG/DL (ref 0.57–1)
DEPRECATED RDW RBC AUTO: 42.7 FL (ref 37–54)
EGFRCR SERPLBLD CKD-EPI 2021: 74.6 ML/MIN/1.73
EOSINOPHIL # BLD AUTO: 0.05 10*3/MM3 (ref 0–0.4)
EOSINOPHIL NFR BLD AUTO: 0.6 % (ref 0.3–6.2)
ERYTHROCYTE [DISTWIDTH] IN BLOOD BY AUTOMATED COUNT: 13 % (ref 12.3–15.4)
FOLATE SERPL-MCNC: 12.5 NG/ML (ref 4.78–24.2)
GLOBULIN UR ELPH-MCNC: 2.3 GM/DL
GLUCOSE SERPL-MCNC: 86 MG/DL (ref 65–99)
HCT VFR BLD AUTO: 42.7 % (ref 34–46.6)
HDLC SERPL-MCNC: 82 MG/DL (ref 40–60)
HGB BLD-MCNC: 14.2 G/DL (ref 12–15.9)
IMM GRANULOCYTES # BLD AUTO: 0.02 10*3/MM3 (ref 0–0.05)
IMM GRANULOCYTES NFR BLD AUTO: 0.3 % (ref 0–0.5)
LDLC SERPL CALC-MCNC: 149 MG/DL (ref 0–100)
LDLC/HDLC SERPL: 1.78 {RATIO}
LYMPHOCYTES # BLD AUTO: 1.79 10*3/MM3 (ref 0.7–3.1)
LYMPHOCYTES NFR BLD AUTO: 22.5 % (ref 19.6–45.3)
MCH RBC QN AUTO: 29.6 PG (ref 26.6–33)
MCHC RBC AUTO-ENTMCNC: 33.3 G/DL (ref 31.5–35.7)
MCV RBC AUTO: 89.1 FL (ref 79–97)
MONOCYTES # BLD AUTO: 0.45 10*3/MM3 (ref 0.1–0.9)
MONOCYTES NFR BLD AUTO: 5.7 % (ref 5–12)
NEUTROPHILS NFR BLD AUTO: 5.59 10*3/MM3 (ref 1.7–7)
NEUTROPHILS NFR BLD AUTO: 70.4 % (ref 42.7–76)
NRBC BLD AUTO-RTO: 0 /100 WBC (ref 0–0.2)
PLATELET # BLD AUTO: 296 10*3/MM3 (ref 140–450)
PMV BLD AUTO: 10.6 FL (ref 6–12)
POTASSIUM SERPL-SCNC: 4.5 MMOL/L (ref 3.5–5.2)
PROT SERPL-MCNC: 7.2 G/DL (ref 6–8.5)
RBC # BLD AUTO: 4.79 10*6/MM3 (ref 3.77–5.28)
SODIUM SERPL-SCNC: 137 MMOL/L (ref 136–145)
T4 FREE SERPL-MCNC: 1.47 NG/DL (ref 0.93–1.7)
TRIGL SERPL-MCNC: 125 MG/DL (ref 0–150)
TSH SERPL DL<=0.05 MIU/L-ACNC: 2.51 UIU/ML (ref 0.27–4.2)
VIT B12 BLD-MCNC: 1100 PG/ML (ref 211–946)
VLDLC SERPL-MCNC: 22 MG/DL (ref 5–40)
WBC NRBC COR # BLD AUTO: 7.94 10*3/MM3 (ref 3.4–10.8)

## 2024-01-15 PROCEDURE — 84439 ASSAY OF FREE THYROXINE: CPT

## 2024-01-15 PROCEDURE — 99396 PREV VISIT EST AGE 40-64: CPT | Performed by: PHYSICIAN ASSISTANT

## 2024-01-15 PROCEDURE — 82306 VITAMIN D 25 HYDROXY: CPT

## 2024-01-15 PROCEDURE — 82746 ASSAY OF FOLIC ACID SERUM: CPT

## 2024-01-15 PROCEDURE — 36415 COLL VENOUS BLD VENIPUNCTURE: CPT

## 2024-01-15 PROCEDURE — 82607 VITAMIN B-12: CPT

## 2024-01-15 PROCEDURE — 80050 GENERAL HEALTH PANEL: CPT

## 2024-01-15 PROCEDURE — 80061 LIPID PANEL: CPT

## 2024-01-15 RX ORDER — LEVOTHYROXINE SODIUM 88 UG/1
88 CAPSULE ORAL DAILY
Qty: 90 CAPSULE | Refills: 1 | Status: SHIPPED | OUTPATIENT
Start: 2024-01-15

## 2024-02-08 ENCOUNTER — TRANSCRIBE ORDERS (OUTPATIENT)
Dept: ADMINISTRATIVE | Facility: HOSPITAL | Age: 54
End: 2024-02-08
Payer: COMMERCIAL

## 2024-02-08 DIAGNOSIS — Z12.31 BREAST CANCER SCREENING BY MAMMOGRAM: Primary | ICD-10-CM

## 2024-02-15 ENCOUNTER — LAB REQUISITION (OUTPATIENT)
Dept: LAB | Facility: HOSPITAL | Age: 54
End: 2024-02-15
Payer: COMMERCIAL

## 2024-02-15 DIAGNOSIS — R30.0 DYSURIA: ICD-10-CM

## 2024-02-15 PROCEDURE — 87086 URINE CULTURE/COLONY COUNT: CPT | Performed by: OBSTETRICS & GYNECOLOGY

## 2024-02-17 LAB — BACTERIA SPEC AEROBE CULT: NO GROWTH

## 2024-04-17 ENCOUNTER — HOSPITAL ENCOUNTER (OUTPATIENT)
Dept: MAMMOGRAPHY | Facility: HOSPITAL | Age: 54
Discharge: HOME | End: 2024-04-17
Admitting: OBSTETRICS & GYNECOLOGY
Payer: COMMERCIAL

## 2024-04-17 DIAGNOSIS — Z12.31 BREAST CANCER SCREENING BY MAMMOGRAM: ICD-10-CM

## 2024-04-17 PROCEDURE — 77063 BREAST TOMOSYNTHESIS BI: CPT

## 2024-04-17 PROCEDURE — 77067 SCR MAMMO BI INCL CAD: CPT

## 2024-07-05 NOTE — PROGRESS NOTES
Chief Complaint  Chief Complaint   Patient presents with    Follow-up     6 month     Hypothyroidism       Subjective          Melissa Ruth presents to [facility] for 6mth follow up.    History of Present Illness    Hypothyroidism: Patient is currently on Tirosint 88 MCG and doing well. Patient states energy is good. Patient denies weight changes, bowel changes and changes in hair, skin and nails.      Vitamin D deficiency: Patient is currently on Vitamin D3 125 MCG for vitamin D deficiency. Patient's levels have been checked 6mths ago and were normal. Patient denies any fatigue and muscle cramping.    Patient notes a small bump on the right side of mid back with a burning/discomfort feeling; noted on Saturday; no change. Unsure if a bug bite.     Mammo: ordered 3/30/23; followed by Dr. Medrano; additional imaging recommended; benign cyst  Recently had ablation; no longer having periods.  Colon: 11.30.20    Labs: 1/15/24:  -LDL much more elevated than previous; decrease fats and fried foods in diet and increase exercise.     Medical History: has a past medical history of Alcoholism, Allergic (20 years ago), Allergies, Anxiety, Arthritis, Bursitis (09/05/2017), Chemical dependency, Depression, Gastrocnemius strain, left (09/05/2017), GERD (gastroesophageal reflux disease) (2010), Hypothyroidism (08/18/2015), Neuromuscular disorder (09/05/2017), Night sweats, PONV (postoperative nausea and vomiting), Seasonal allergies, Substance abuse (1994), Thyroid disorder, and Ulcer (2010).   Surgical History: has a past surgical history that includes Colonoscopy; Knee surgery (Bilateral, 1997); Tonsillectomy; Wrist surgery (Right, 2014); Upper gastrointestinal endoscopy (2000’s); Essure tubal ligation; and d & c hysteroscopy endometrial ablation (N/A, 2/3/2023).   Family History: family history includes Alcohol abuse in her father; Arthritis in her father and mother; Lung cancer in an other  family member; Other in an other family member; Stroke in her father.   Social History: reports that she quit smoking about 20 years ago. Her smoking use included cigarettes. She started smoking about 40 years ago. She has a 20.1 pack-year smoking history. She has been exposed to tobacco smoke. She has never used smokeless tobacco. She reports that she does not currently use alcohol. She reports that she does not currently use drugs after having used the following drugs: Marijuana.    Allergies: Penicillins    Health Maintenance Due   Topic Date Due    PAP SMEAR  12/08/2023       Objective     Vitals:    07/08/24 0957   BP: 114/76   Pulse: 64   Resp: 20   SpO2: 95%   Weight: 60.7 kg (133 lb 12.8 oz)     Body mass index is 22.27 kg/m².     Wt Readings from Last 3 Encounters:   07/08/24 60.7 kg (133 lb 12.8 oz)   01/15/24 59.2 kg (130 lb 9.6 oz)   07/24/23 59.2 kg (130 lb 9.6 oz)     BP Readings from Last 3 Encounters:   07/08/24 114/76   01/15/24 114/83   07/24/23 108/70       BMI is within normal parameters. No other follow-up for BMI required.      Patient Care Team:  Lluvia Perry PA as PCP - General (Family Medicine)  Ashley Buck APRN as Nurse Practitioner (Nurse Practitioner)  Huan Caro MD as Consulting Physician (Gastroenterology)  Tracey Smart APRN as Nurse Practitioner (Gastroenterology)    Physical Exam  Vitals and nursing note reviewed.   Constitutional:       Appearance: Normal appearance.   HENT:      Head: Normocephalic and atraumatic.   Neck:      Vascular: No carotid bruit.      Comments: Thyroid : gland size normal, nontender, no nodules or masses present on palpation   Cardiovascular:      Rate and Rhythm: Normal rate and regular rhythm.      Pulses: Normal pulses.      Heart sounds: Normal heart sounds.   Pulmonary:      Effort: Pulmonary effort is normal.      Breath sounds: Normal breath sounds.   Musculoskeletal:      Cervical back: Neck supple. No tenderness.       Right lower leg: No edema.      Left lower leg: No edema.   Lymphadenopathy:      Cervical: No cervical adenopathy.   Skin:     Comments: One small elevate papule on right mid back; no pus, redness or drainage.   Neurological:      Mental Status: She is alert.   Psychiatric:         Mood and Affect: Mood normal.         Behavior: Behavior normal.           Result Review :              Assessment and Plan      Diagnoses and all orders for this visit:    1. Mixed hyperlipidemia (Primary)  Comments:  Check labs for stability.  Orders:  -     Comprehensive Metabolic Panel; Future  -     Lipid Panel; Future    2. Acquired hypothyroidism  Comments:  Stable; continue with Tirosint 88mcg daily; check labs and f/u in 6mths.  Orders:  -     Tirosint 88 MCG capsule; Take 1 capsule by mouth Daily.  Dispense: 90 capsule; Refill: 1  -     TSH+Free T4; Future    3. Vitamin D deficiency  Comments:  Stable: Labs ordered today, refill sent  Orders:  -     vitamin D3 125 MCG (5000 UT) capsule capsule; Take 1 capsule by mouth Daily.  Dispense: 90 capsule; Refill: 1    4. Herpes labialis  Comments:  Not currently with outbreak but needs refills for when an outbreak were to occur.  Orders:  -     valACYclovir (Valtrex) 1000 MG tablet; Take 2 tablets by mouth 2 (Two) Times a Day.  Dispense: 4 tablet; Refill: 5    5. Skin lesion of back  Comments:  insect bite vs start of herpes zoster. Patinet to try OTC hydrocortisone cream to affected area; message is area becomes a rash.            Follow Up     Return in about 6 months (around 1/8/2025).    Patient was given instructions and counseling regarding her condition or for health maintenance advice. Please see specific information pulled into the AVS if appropriate.

## 2024-07-08 ENCOUNTER — OFFICE VISIT (OUTPATIENT)
Dept: FAMILY MEDICINE CLINIC | Facility: CLINIC | Age: 54
End: 2024-07-08
Payer: COMMERCIAL

## 2024-07-08 ENCOUNTER — LAB (OUTPATIENT)
Dept: LAB | Facility: HOSPITAL | Age: 54
End: 2024-07-08
Payer: COMMERCIAL

## 2024-07-08 VITALS
RESPIRATION RATE: 20 BRPM | SYSTOLIC BLOOD PRESSURE: 114 MMHG | WEIGHT: 133.8 LBS | DIASTOLIC BLOOD PRESSURE: 76 MMHG | BODY MASS INDEX: 22.27 KG/M2 | HEART RATE: 64 BPM | OXYGEN SATURATION: 95 %

## 2024-07-08 DIAGNOSIS — B00.1 HERPES LABIALIS: ICD-10-CM

## 2024-07-08 DIAGNOSIS — E78.2 MIXED HYPERLIPIDEMIA: Chronic | ICD-10-CM

## 2024-07-08 DIAGNOSIS — E78.2 MIXED HYPERLIPIDEMIA: Primary | Chronic | ICD-10-CM

## 2024-07-08 DIAGNOSIS — L98.9 SKIN LESION OF BACK: ICD-10-CM

## 2024-07-08 DIAGNOSIS — E03.9 ACQUIRED HYPOTHYROIDISM: Chronic | ICD-10-CM

## 2024-07-08 DIAGNOSIS — E55.9 VITAMIN D DEFICIENCY: Chronic | ICD-10-CM

## 2024-07-08 LAB
ALBUMIN SERPL-MCNC: 4.5 G/DL (ref 3.5–5.2)
ALBUMIN/GLOB SERPL: 1.7 G/DL
ALP SERPL-CCNC: 97 U/L (ref 39–117)
ALT SERPL W P-5'-P-CCNC: 23 U/L (ref 1–33)
ANION GAP SERPL CALCULATED.3IONS-SCNC: 12 MMOL/L (ref 5–15)
AST SERPL-CCNC: 29 U/L (ref 1–32)
BILIRUB SERPL-MCNC: 0.8 MG/DL (ref 0–1.2)
BUN SERPL-MCNC: 14 MG/DL (ref 6–20)
BUN/CREAT SERPL: 15.7 (ref 7–25)
CALCIUM SPEC-SCNC: 9.5 MG/DL (ref 8.6–10.5)
CHLORIDE SERPL-SCNC: 102 MMOL/L (ref 98–107)
CHOLEST SERPL-MCNC: 213 MG/DL (ref 0–200)
CO2 SERPL-SCNC: 25 MMOL/L (ref 22–29)
CREAT SERPL-MCNC: 0.89 MG/DL (ref 0.57–1)
EGFRCR SERPLBLD CKD-EPI 2021: 77.2 ML/MIN/1.73
GLOBULIN UR ELPH-MCNC: 2.7 GM/DL
GLUCOSE SERPL-MCNC: 86 MG/DL (ref 65–99)
HDLC SERPL-MCNC: 88 MG/DL (ref 40–60)
LDLC SERPL CALC-MCNC: 110 MG/DL (ref 0–100)
LDLC/HDLC SERPL: 1.23 {RATIO}
POTASSIUM SERPL-SCNC: 4.5 MMOL/L (ref 3.5–5.2)
PROT SERPL-MCNC: 7.2 G/DL (ref 6–8.5)
SODIUM SERPL-SCNC: 139 MMOL/L (ref 136–145)
T4 FREE SERPL-MCNC: 1.62 NG/DL (ref 0.92–1.68)
TRIGL SERPL-MCNC: 86 MG/DL (ref 0–150)
TSH SERPL DL<=0.05 MIU/L-ACNC: 0.96 UIU/ML (ref 0.27–4.2)
VLDLC SERPL-MCNC: 15 MG/DL (ref 5–40)

## 2024-07-08 PROCEDURE — 80061 LIPID PANEL: CPT

## 2024-07-08 PROCEDURE — 99214 OFFICE O/P EST MOD 30 MIN: CPT | Performed by: PHYSICIAN ASSISTANT

## 2024-07-08 PROCEDURE — 84439 ASSAY OF FREE THYROXINE: CPT

## 2024-07-08 PROCEDURE — 84443 ASSAY THYROID STIM HORMONE: CPT

## 2024-07-08 PROCEDURE — 80053 COMPREHEN METABOLIC PANEL: CPT

## 2024-07-08 PROCEDURE — 36415 COLL VENOUS BLD VENIPUNCTURE: CPT

## 2024-07-08 RX ORDER — VALACYCLOVIR HYDROCHLORIDE 1 G/1
2000 TABLET, FILM COATED ORAL 2 TIMES DAILY
Qty: 4 TABLET | Refills: 5 | Status: SHIPPED | OUTPATIENT
Start: 2024-07-08

## 2024-07-08 RX ORDER — LEVOTHYROXINE SODIUM 88 UG/1
88 CAPSULE ORAL DAILY
Qty: 90 CAPSULE | Refills: 1 | Status: SHIPPED | OUTPATIENT
Start: 2024-07-08

## 2024-07-09 ENCOUNTER — PATIENT MESSAGE (OUTPATIENT)
Dept: FAMILY MEDICINE CLINIC | Facility: CLINIC | Age: 54
End: 2024-07-09
Payer: COMMERCIAL

## 2024-07-09 DIAGNOSIS — B02.9 HERPES ZOSTER WITHOUT COMPLICATION: Primary | ICD-10-CM

## 2024-07-09 RX ORDER — VALACYCLOVIR HYDROCHLORIDE 1 G/1
1000 TABLET, FILM COATED ORAL 3 TIMES DAILY
Qty: 21 TABLET | Refills: 0 | Status: SHIPPED | OUTPATIENT
Start: 2024-07-09 | End: 2024-07-16

## 2024-07-09 NOTE — TELEPHONE ENCOUNTER
From: Melissa Ruth  To: Lluvia Perry  Sent: 7/9/2024 9:38 AM EDT  Subject: Was in yesterday looking at my back.     Hello, this is Melissa Ruth. I hav more bumps on my back this morning and my  said it’s looking like a rash. Still in pain in that spot and burning. Could I please get something called in This is not getting any better. Thank you

## 2025-01-24 NOTE — PROGRESS NOTES
Chief Complaint  Chief Complaint   Patient presents with    Follow-up     6 month     Annual Exam    Hypothyroidism       Subjective          Melissa Ruth presents to [facility] annual physical and 6mth f/u.    History of Present Illness  Last pap: about 3 years ago  Any history of abnormal pap: no  LMP: ablation   SBE: yes, no abnormal findings.  MM24--followed by Dr. Medrano and has an upcoming visit.  Dental exam up to date: yes  Eye exam up to date: yes  CLN: 2020    Hypothyroidism: Patient is currently on Tirosint 88 MCG and doing well. Patient states energy is good. Patient denies weight changes, bowel changes and changes in hair, skin and nails.      Vitamin D deficiency: Patient is currently on Vitamin D3 125 MCG for vitamin D deficiency. Patient's levels have been checked 6mths ago and were normal. Patient denies any fatigue and muscle cramping.    Patient does complain of some hot flashes and irritability, would like hormones tested.     Labs:   24  --cholesterol panel has improved  --liver enzyme that was elevated last time is now normal  --thyroid function is normal      Medical History: has a past medical history of Alcoholism, Allergic (20 years ago), Allergies, Anxiety, Arthritis, Bursitis (2017), Chemical dependency, Depression, Gastrocnemius strain, left (2017), GERD (gastroesophageal reflux disease) (), Hypothyroidism (2015), Neuromuscular disorder (2017), Night sweats, PONV (postoperative nausea and vomiting), Seasonal allergies, Substance abuse (), Thyroid disorder, and Ulcer ().   Surgical History: has a past surgical history that includes Colonoscopy; Knee surgery (Bilateral, ); Tonsillectomy; Wrist surgery (Right, ); Upper gastrointestinal endoscopy (’s); Essure tubal ligation; and d & c hysteroscopy endometrial ablation (N/A, 2/3/2023).   Family History: family history includes Alcohol abuse  in her father; Arthritis in her father and mother; Lung cancer in an other family member; Other in an other family member; Stroke in her father.   Social History: reports that she quit smoking about 20 years ago. Her smoking use included cigarettes. She started smoking about 40 years ago. She has a 20.1 pack-year smoking history. She has been exposed to tobacco smoke. She has never used smokeless tobacco. She reports that she does not currently use alcohol. She reports that she does not currently use drugs after having used the following drugs: Marijuana.    Allergies: Penicillins    Health Maintenance Due   Topic Date Due    PAP SMEAR  12/08/2023    ANNUAL PHYSICAL  01/15/2025       Objective     Vitals:    01/27/25 1014   BP: 117/80   Pulse: 65   Resp: 20   SpO2: 98%   Weight: 62.1 kg (137 lb)     Body mass index is 22.8 kg/m².     Wt Readings from Last 3 Encounters:   01/27/25 62.1 kg (137 lb)   07/08/24 60.7 kg (133 lb 12.8 oz)   01/15/24 59.2 kg (130 lb 9.6 oz)     BP Readings from Last 3 Encounters:   01/27/25 117/80   07/08/24 114/76   01/15/24 114/83       Patient Care Team:  Lluvia Perry PA as PCP - General (Family Medicine)  Ashley Buck APRN as Nurse Practitioner (Nurse Practitioner)  Huan Caro MD as Consulting Physician (Gastroenterology)  Tracey Smart APRN as Nurse Practitioner (Gastroenterology)    Physical Exam  Vitals and nursing note reviewed.   Constitutional:       Appearance: Normal appearance.   HENT:      Head: Normocephalic and atraumatic.   Neck:      Vascular: No carotid bruit.      Comments:    Cardiovascular:      Rate and Rhythm: Normal rate and regular rhythm.      Pulses: Normal pulses.      Heart sounds: Normal heart sounds.   Pulmonary:      Effort: Pulmonary effort is normal.      Breath sounds: Normal breath sounds.   Musculoskeletal:      Cervical back: Neck supple. No tenderness.   Lymphadenopathy:      Cervical: No cervical adenopathy.    Neurological:      Mental Status: She is alert.   Psychiatric:         Mood and Affect: Mood normal.         Behavior: Behavior normal.           Result Review :              Assessment and Plan      Diagnoses and all orders for this visit:    1. Annual physical exam (Primary)  -     Comprehensive Metabolic Panel; Future  -     Lipid Panel; Future  -     CBC & Differential; Future  -     TSH; Future    2. Hot flashes  Comments:  New sx: check labs; discussed OTC menopausal probiotic.  Orders:  -     Estrogens, Total; Future  -     Follicle Stimulating Hormone; Future  -     Luteinizing Hormone; Future  -     Progesterone; Future  -     Testosterone, Total, Women, Children, and Hypogonadal Males; Future    3. Vitamin D deficiency  -     Vitamin D 25 hydroxy; Future  -     vitamin D3 125 MCG (5000 UT) capsule capsule; Take 1 capsule by mouth Daily.  Dispense: 90 capsule; Refill: 1    4. Acquired hypothyroidism  Comments:  Stable; continue with Tirosint 88mcg daily; check labs and f/u in 6mths.  Orders:  -     TSH; Future  -     T4, free; Future  -     Tirosint 88 MCG capsule; Take 1 capsule by mouth Daily.  Dispense: 90 capsule; Refill: 1    5. Herpes labialis  Comments:  Not currently with outbreak but needs refills for when an outbreak were to occur.  Orders:  -     valACYclovir (Valtrex) 1000 MG tablet; Take 2 tablets by mouth 2 (Two) Times a Day.  Dispense: 4 tablet; Refill: 5    6. Vitamin D deficiency  Comments:  Stable: Labs ordered today, refill sent  Orders:  -     Vitamin D 25 hydroxy; Future  -     vitamin D3 125 MCG (5000 UT) capsule capsule; Take 1 capsule by mouth Daily.  Dispense: 90 capsule; Refill: 1    The patient is advised to continue current medications, continue current healthy lifestyle patterns, and return for routine annual checkups.        Follow Up     Return in about 6 months (around 7/27/2025).    Patient was given instructions and counseling regarding her condition or for health  maintenance advice. Please see specific information pulled into the AVS if appropriate.

## 2025-01-27 ENCOUNTER — OFFICE VISIT (OUTPATIENT)
Dept: FAMILY MEDICINE CLINIC | Facility: CLINIC | Age: 55
End: 2025-01-27
Payer: COMMERCIAL

## 2025-01-27 ENCOUNTER — LAB (OUTPATIENT)
Dept: LAB | Facility: HOSPITAL | Age: 55
End: 2025-01-27
Payer: COMMERCIAL

## 2025-01-27 VITALS
WEIGHT: 137 LBS | BODY MASS INDEX: 22.8 KG/M2 | OXYGEN SATURATION: 98 % | DIASTOLIC BLOOD PRESSURE: 80 MMHG | SYSTOLIC BLOOD PRESSURE: 117 MMHG | RESPIRATION RATE: 20 BRPM | HEART RATE: 65 BPM

## 2025-01-27 DIAGNOSIS — E55.9 VITAMIN D DEFICIENCY: Chronic | ICD-10-CM

## 2025-01-27 DIAGNOSIS — R23.2 HOT FLASHES: ICD-10-CM

## 2025-01-27 DIAGNOSIS — Z00.00 ANNUAL PHYSICAL EXAM: ICD-10-CM

## 2025-01-27 DIAGNOSIS — Z00.00 ANNUAL PHYSICAL EXAM: Primary | ICD-10-CM

## 2025-01-27 DIAGNOSIS — E55.9 VITAMIN D DEFICIENCY: ICD-10-CM

## 2025-01-27 DIAGNOSIS — B00.1 HERPES LABIALIS: ICD-10-CM

## 2025-01-27 DIAGNOSIS — E03.9 ACQUIRED HYPOTHYROIDISM: Chronic | ICD-10-CM

## 2025-01-27 LAB
25(OH)D3 SERPL-MCNC: 108 NG/ML (ref 30–100)
ALBUMIN SERPL-MCNC: 4.5 G/DL (ref 3.5–5.2)
ALBUMIN/GLOB SERPL: 1.4 G/DL
ALP SERPL-CCNC: 102 U/L (ref 39–117)
ALT SERPL W P-5'-P-CCNC: 37 U/L (ref 1–33)
ANION GAP SERPL CALCULATED.3IONS-SCNC: 11.4 MMOL/L (ref 5–15)
AST SERPL-CCNC: 39 U/L (ref 1–32)
BASOPHILS # BLD AUTO: 0.03 10*3/MM3 (ref 0–0.2)
BASOPHILS NFR BLD AUTO: 0.3 % (ref 0–1.5)
BILIRUB SERPL-MCNC: 0.6 MG/DL (ref 0–1.2)
BUN SERPL-MCNC: 13 MG/DL (ref 6–20)
BUN/CREAT SERPL: 12.9 (ref 7–25)
CALCIUM SPEC-SCNC: 9.9 MG/DL (ref 8.6–10.5)
CHLORIDE SERPL-SCNC: 100 MMOL/L (ref 98–107)
CHOLEST SERPL-MCNC: 250 MG/DL (ref 0–200)
CO2 SERPL-SCNC: 24.6 MMOL/L (ref 22–29)
CREAT SERPL-MCNC: 1.01 MG/DL (ref 0.57–1)
DEPRECATED RDW RBC AUTO: 39.9 FL (ref 37–54)
EGFRCR SERPLBLD CKD-EPI 2021: 66.3 ML/MIN/1.73
EOSINOPHIL # BLD AUTO: 0.03 10*3/MM3 (ref 0–0.4)
EOSINOPHIL NFR BLD AUTO: 0.3 % (ref 0.3–6.2)
ERYTHROCYTE [DISTWIDTH] IN BLOOD BY AUTOMATED COUNT: 12.8 % (ref 12.3–15.4)
FSH SERPL-ACNC: 56.1 MIU/ML
GLOBULIN UR ELPH-MCNC: 3.2 GM/DL
GLUCOSE SERPL-MCNC: 83 MG/DL (ref 65–99)
HCT VFR BLD AUTO: 42.9 % (ref 34–46.6)
HDLC SERPL-MCNC: 91 MG/DL (ref 40–60)
HGB BLD-MCNC: 14.4 G/DL (ref 12–15.9)
IMM GRANULOCYTES # BLD AUTO: 0.02 10*3/MM3 (ref 0–0.05)
IMM GRANULOCYTES NFR BLD AUTO: 0.2 % (ref 0–0.5)
LDLC SERPL CALC-MCNC: 131 MG/DL (ref 0–100)
LDLC/HDLC SERPL: 1.39 {RATIO}
LH SERPL-ACNC: 41 MIU/ML
LYMPHOCYTES # BLD AUTO: 1.77 10*3/MM3 (ref 0.7–3.1)
LYMPHOCYTES NFR BLD AUTO: 18.3 % (ref 19.6–45.3)
MCH RBC QN AUTO: 29.3 PG (ref 26.6–33)
MCHC RBC AUTO-ENTMCNC: 33.6 G/DL (ref 31.5–35.7)
MCV RBC AUTO: 87.2 FL (ref 79–97)
MONOCYTES # BLD AUTO: 0.43 10*3/MM3 (ref 0.1–0.9)
MONOCYTES NFR BLD AUTO: 4.5 % (ref 5–12)
NEUTROPHILS NFR BLD AUTO: 7.37 10*3/MM3 (ref 1.7–7)
NEUTROPHILS NFR BLD AUTO: 76.4 % (ref 42.7–76)
NRBC BLD AUTO-RTO: 0 /100 WBC (ref 0–0.2)
PLATELET # BLD AUTO: 336 10*3/MM3 (ref 140–450)
PMV BLD AUTO: 10.4 FL (ref 6–12)
POTASSIUM SERPL-SCNC: 4.5 MMOL/L (ref 3.5–5.2)
PROGEST SERPL-MCNC: 0.25 NG/ML
PROT SERPL-MCNC: 7.7 G/DL (ref 6–8.5)
RBC # BLD AUTO: 4.92 10*6/MM3 (ref 3.77–5.28)
SODIUM SERPL-SCNC: 136 MMOL/L (ref 136–145)
T4 FREE SERPL-MCNC: 1.47 NG/DL (ref 0.92–1.68)
TRIGL SERPL-MCNC: 162 MG/DL (ref 0–150)
TSH SERPL DL<=0.05 MIU/L-ACNC: 1.47 UIU/ML (ref 0.27–4.2)
VLDLC SERPL-MCNC: 28 MG/DL (ref 5–40)
WBC NRBC COR # BLD AUTO: 9.65 10*3/MM3 (ref 3.4–10.8)

## 2025-01-27 PROCEDURE — 99396 PREV VISIT EST AGE 40-64: CPT | Performed by: PHYSICIAN ASSISTANT

## 2025-01-27 PROCEDURE — 82672 ASSAY OF ESTROGEN: CPT

## 2025-01-27 PROCEDURE — 80050 GENERAL HEALTH PANEL: CPT

## 2025-01-27 PROCEDURE — 83002 ASSAY OF GONADOTROPIN (LH): CPT

## 2025-01-27 PROCEDURE — 82306 VITAMIN D 25 HYDROXY: CPT

## 2025-01-27 PROCEDURE — 36415 COLL VENOUS BLD VENIPUNCTURE: CPT

## 2025-01-27 PROCEDURE — 84144 ASSAY OF PROGESTERONE: CPT

## 2025-01-27 PROCEDURE — 83001 ASSAY OF GONADOTROPIN (FSH): CPT

## 2025-01-27 PROCEDURE — 80061 LIPID PANEL: CPT

## 2025-01-27 PROCEDURE — 84403 ASSAY OF TOTAL TESTOSTERONE: CPT

## 2025-01-27 PROCEDURE — 84439 ASSAY OF FREE THYROXINE: CPT

## 2025-01-27 RX ORDER — VALACYCLOVIR HYDROCHLORIDE 1 G/1
2000 TABLET, FILM COATED ORAL 2 TIMES DAILY
Qty: 4 TABLET | Refills: 5 | Status: SHIPPED | OUTPATIENT
Start: 2025-01-27

## 2025-01-27 RX ORDER — LEVOTHYROXINE SODIUM 88 UG/1
88 CAPSULE ORAL DAILY
Qty: 90 CAPSULE | Refills: 1 | Status: SHIPPED | OUTPATIENT
Start: 2025-01-27

## 2025-01-30 LAB — ESTROGEN SERPL-MCNC: 235 PG/ML

## 2025-02-03 LAB — TESTOST SERPL-MCNC: 30 NG/DL

## 2025-02-04 ENCOUNTER — TELEPHONE (OUTPATIENT)
Dept: FAMILY MEDICINE CLINIC | Facility: CLINIC | Age: 55
End: 2025-02-04
Payer: COMMERCIAL

## 2025-02-04 NOTE — TELEPHONE ENCOUNTER
Caller: Melissa Ruth    Relationship: Self    Best call back number: [phone]    What test was performed: BLOOD WORK     When was the test performed: 1.27.25    Where was the test performed: [facility]

## 2025-02-05 DIAGNOSIS — R79.89 ELEVATED LFTS: Primary | ICD-10-CM

## 2025-02-21 ENCOUNTER — TRANSCRIBE ORDERS (OUTPATIENT)
Dept: ADMINISTRATIVE | Facility: HOSPITAL | Age: 55
End: 2025-02-21
Payer: COMMERCIAL

## 2025-02-21 DIAGNOSIS — Z12.31 SCREENING MAMMOGRAM, ENCOUNTER FOR: Primary | ICD-10-CM

## 2025-04-01 ENCOUNTER — LAB (OUTPATIENT)
Dept: LAB | Facility: HOSPITAL | Age: 55
End: 2025-04-01
Payer: COMMERCIAL

## 2025-04-01 DIAGNOSIS — R79.89 ELEVATED LFTS: ICD-10-CM

## 2025-04-01 PROCEDURE — 36415 COLL VENOUS BLD VENIPUNCTURE: CPT

## 2025-04-01 PROCEDURE — 80053 COMPREHEN METABOLIC PANEL: CPT

## 2025-04-02 LAB
ALBUMIN SERPL-MCNC: 4.3 G/DL (ref 3.5–5.2)
ALBUMIN/GLOB SERPL: 1.7 G/DL
ALP SERPL-CCNC: 89 U/L (ref 39–117)
ALT SERPL W P-5'-P-CCNC: 22 U/L (ref 1–33)
ANION GAP SERPL CALCULATED.3IONS-SCNC: 9.9 MMOL/L (ref 5–15)
AST SERPL-CCNC: 29 U/L (ref 1–32)
BILIRUB SERPL-MCNC: 0.3 MG/DL (ref 0–1.2)
BUN SERPL-MCNC: 11 MG/DL (ref 6–20)
BUN/CREAT SERPL: 11.5 (ref 7–25)
CALCIUM SPEC-SCNC: 9.7 MG/DL (ref 8.6–10.5)
CHLORIDE SERPL-SCNC: 103 MMOL/L (ref 98–107)
CO2 SERPL-SCNC: 26.1 MMOL/L (ref 22–29)
CREAT SERPL-MCNC: 0.96 MG/DL (ref 0.57–1)
EGFRCR SERPLBLD CKD-EPI 2021: 70 ML/MIN/1.73
GLOBULIN UR ELPH-MCNC: 2.6 GM/DL
GLUCOSE SERPL-MCNC: 106 MG/DL (ref 65–99)
POTASSIUM SERPL-SCNC: 3.8 MMOL/L (ref 3.5–5.2)
PROT SERPL-MCNC: 6.9 G/DL (ref 6–8.5)
SODIUM SERPL-SCNC: 139 MMOL/L (ref 136–145)

## 2025-04-18 ENCOUNTER — HOSPITAL ENCOUNTER (OUTPATIENT)
Dept: MAMMOGRAPHY | Facility: HOSPITAL | Age: 55
Discharge: HOME | End: 2025-04-18
Admitting: OBSTETRICS & GYNECOLOGY
Payer: COMMERCIAL

## 2025-04-18 DIAGNOSIS — Z12.31 SCREENING MAMMOGRAM, ENCOUNTER FOR: ICD-10-CM

## 2025-04-18 PROCEDURE — 77067 SCR MAMMO BI INCL CAD: CPT

## 2025-04-18 PROCEDURE — 77063 BREAST TOMOSYNTHESIS BI: CPT

## 2025-05-12 PROBLEM — N92.4 EXCESSIVE BLEEDING IN PREMENOPAUSAL PERIOD: Status: RESOLVED | Noted: 2022-12-13 | Resolved: 2025-05-12

## 2025-05-12 NOTE — PROGRESS NOTES
"GYN new patient    CC: menopausal symptoms    Tobacco/Nicotine use: Former; quit in     HPI:   55 y.o. Contraception or HRT: Contraception:  Tubal ligation and postmenopausal    History of Present Illness  The patient presents for evaluation of menopause and acne.    She has been under the care of Dr. Medrano for her oncologic care and other related issues. She was initiated on hormone therapy by Dr. Medrano in 2025, which has significantly reduced her hot flashes from daily occurrences to approximately 4 or 5 episodes since the start of the treatment. Despite the absence of menstrual periods since her endometrial ablation, she continues to experience premenstrual syndrome (PMS) symptoms. She has not undergone a bone density test and reports no family history of osteoporosis. She has a past history of smoking but quit a long time ago. She has no issues with intimacy. She is currently on Prometrium, taken at bedtime, and estradiol p.o.   She reports the onset of acne following the initiation of testosterone therapy, which she attributes to the medication as she has no prior history of acne. She briefly discontinued the testosterone over a weekend but resumed it on Monday. The testosterone gel is applied daily to the inner folds of her elbows or thighs, with a dosage of 20 mg per pump. Her primary care physician checked her testosterone levels in 2025.    SOCIAL HISTORY  She has a past history of smoking but quit a long time ago.    FAMILY HISTORY  She does not report any family history of osteoporosis.  History: PMHx, Meds, Allergies, PSHx, Social Hx, and POBHx all reviewed and updated.  PCP:Lluvia Perry PA      Review of Systems     /76   Pulse 67   Ht 165.1 cm (65\")   Wt 61.2 kg (135 lb)   Breastfeeding No   BMI 22.47 kg/m²     Physical Exam  Vitals and nursing note reviewed.   Constitutional:       Appearance: Normal appearance.   Cardiovascular:      Rate and Rhythm: Normal " rate and regular rhythm.   Pulmonary:      Effort: Pulmonary effort is normal.   Abdominal:      General: Abdomen is flat.   Neurological:      Mental Status: She is alert.         ASSESSMENT AND PLAN:  Problem Visit    Diagnoses and all orders for this visit:    1. Menopausal symptoms (Primary)  -     estradiol ([place]) 0.05 MG/24HR patch; Place 1 patch on the skin as directed by provider 2 (Two) Times a Week.  Dispense: 24 patch; Refill: 3    2. Decreased libido  Assessment & Plan:      Orders:  -     Testosterone (Free & Total), LC / MS; Future  -     Testosterone (Free & Total), LC / MS    3. Genitourinary syndrome of menopause    4. Osteoporosis screening  -     DEXA Bone Density Axial; Future        Assessment & Plan  1. Menopause.  Retains uterus and has been experiencing menopausal symptoms since late 40s, including occasional ovulatory cycles. Hormone therapy initiated in 02/2025 has significantly reduced hot flashes. Importance of sleep in managing weight, anxiety, depression, and stress was emphasized. Benefits of hormone therapy in preventing and treating vasomotor symptoms and osteoporosis were also discussed. Patient was counseled at length regarding clinical diagnosis of perimenopause and menopause. We discussed the menopause Society 2022 position statement on treating menopause with MHT. We reviewed the WHI from 2002 and the changes and recommendations and guidelines since that study was published. We discussed the use of transdermal estrogen and its significantly decreased risk of VTE. We discussed how bioidentical is a marketing term and transdermal estradiol is iso-molecular to the estradiol we make naturally.  And it is FDA approved.  Prometrium is also iso-molecular and FDA approved.  Progesterone is important to protect the endometrium from unopposed estrogen with an intact uterus  - Estrogen will be switched to a patch, to be changed twice weekly, while maintaining current progesterone  dosage.      2. GSM  Use of vaginal estrogen cream to alleviate genitourinary syndrome of menopause symptoms was recommended. A bone density test will be ordered.  - Prescription for vaginal estrogen cream, to be applied twice weekly at bedtime.      3.  Decreased libido  Potential side effects of testosterone therapy, including acne, voice deepening, and clitoral enlargement, were discussedOnset of acne is likely due to the high dose of testosterone gel (20 mg per pump daily). Advised to reduce the frequency of testosterone gel application to every other day. A testosterone level test will be conducted today to monitor levels. If testosterone levels are found to be high, further adjustments to the dosage will be considered.  Counseled the patient that I follow ISS in 2019 guidelines for supplementation of testosterone in postmenopausal women.  The guidelines are to maintain total testosterone at premenopausal physiologic levels.  The expected daily dosing is approximately 300 mcg transdermally    Follow-up  Follow up in 3 months.             Follow Up:  Return in about 3 months (around 8/13/2025).        Ruchi Hull MD  05/13/2025    Patient or patient representative verbalized consent for the use of Ambient Listening during the visit with  Ruchi Hull MD for chart documentation. 5/13/2025  10:32 EDT

## 2025-05-13 ENCOUNTER — OFFICE VISIT (OUTPATIENT)
Dept: OBSTETRICS AND GYNECOLOGY | Age: 55
End: 2025-05-13
Payer: COMMERCIAL

## 2025-05-13 VITALS
DIASTOLIC BLOOD PRESSURE: 76 MMHG | SYSTOLIC BLOOD PRESSURE: 109 MMHG | WEIGHT: 135 LBS | HEIGHT: 65 IN | BODY MASS INDEX: 22.49 KG/M2 | HEART RATE: 67 BPM

## 2025-05-13 DIAGNOSIS — Z13.820 OSTEOPOROSIS SCREENING: ICD-10-CM

## 2025-05-13 DIAGNOSIS — N95.1 MENOPAUSAL SYMPTOMS: Primary | ICD-10-CM

## 2025-05-13 DIAGNOSIS — N95.8 GENITOURINARY SYNDROME OF MENOPAUSE: ICD-10-CM

## 2025-05-13 DIAGNOSIS — R68.82 DECREASED LIBIDO: ICD-10-CM

## 2025-05-13 PROCEDURE — 84403 ASSAY OF TOTAL TESTOSTERONE: CPT | Performed by: OBSTETRICS & GYNECOLOGY

## 2025-05-13 PROCEDURE — 84402 ASSAY OF FREE TESTOSTERONE: CPT | Performed by: OBSTETRICS & GYNECOLOGY

## 2025-05-13 RX ORDER — TRIAMCINOLONE ACETONIDE 40 MG/ML
INJECTION, SUSPENSION INTRA-ARTICULAR; INTRAMUSCULAR
COMMUNITY
Start: 2024-10-24 | End: 2025-05-13

## 2025-05-13 RX ORDER — ESTRADIOL 1 MG/1
1 TABLET ORAL DAILY
COMMUNITY
Start: 2025-02-21 | End: 2025-05-13 | Stop reason: DRUGHIGH

## 2025-05-13 RX ORDER — TESTOSTERONE 20.25 MG/1.25G
GEL TOPICAL
COMMUNITY

## 2025-05-13 RX ORDER — PROGESTERONE 100 MG/1
1 CAPSULE ORAL DAILY
COMMUNITY
Start: 2025-02-21

## 2025-05-13 RX ORDER — PREDNISOLONE ACETATE 10 MG/ML
SUSPENSION/ DROPS OPHTHALMIC
COMMUNITY
Start: 2025-05-08

## 2025-05-13 RX ORDER — ESTRADIOL 0.05 MG/D
1 PATCH, EXTENDED RELEASE TRANSDERMAL 2 TIMES WEEKLY
Qty: 24 PATCH | Refills: 3 | Status: SHIPPED | OUTPATIENT
Start: 2025-05-15

## 2025-05-16 LAB
TESTOST FREE SERPL-MCNC: 1.1 PG/ML (ref 0–4.2)
TESTOST SERPL-MCNC: 179.7 NG/DL

## 2025-05-24 ENCOUNTER — DOCUMENTATION (OUTPATIENT)
Dept: OBSTETRICS AND GYNECOLOGY | Age: 55
End: 2025-05-24
Payer: COMMERCIAL

## 2025-05-24 DIAGNOSIS — Z13.820 OSTEOPOROSIS SCREENING: Primary | ICD-10-CM

## 2025-05-24 RX ORDER — ESTRADIOL 0.1 MG/G
CREAM VAGINAL
Qty: 42.5 G | Refills: 3 | Status: SHIPPED | OUTPATIENT
Start: 2025-05-24

## 2025-05-27 ENCOUNTER — PATIENT MESSAGE (OUTPATIENT)
Dept: OBSTETRICS AND GYNECOLOGY | Age: 55
End: 2025-05-27
Payer: COMMERCIAL

## 2025-05-27 NOTE — TELEPHONE ENCOUNTER
Messaged pt about testosterone levels being elevated. And recommendations are to hold off on starting testosterone and to repeat labs in 1 month to re-evaluate.

## 2025-07-17 ENCOUNTER — OFFICE VISIT (OUTPATIENT)
Dept: FAMILY MEDICINE CLINIC | Facility: CLINIC | Age: 55
End: 2025-07-17
Payer: COMMERCIAL

## 2025-07-17 VITALS
SYSTOLIC BLOOD PRESSURE: 131 MMHG | OXYGEN SATURATION: 97 % | TEMPERATURE: 98 F | HEIGHT: 65 IN | DIASTOLIC BLOOD PRESSURE: 89 MMHG | WEIGHT: 134 LBS | HEART RATE: 68 BPM | BODY MASS INDEX: 22.33 KG/M2

## 2025-07-17 DIAGNOSIS — R05.1 ACUTE COUGH: ICD-10-CM

## 2025-07-17 DIAGNOSIS — J01.10 ACUTE NON-RECURRENT FRONTAL SINUSITIS: Primary | ICD-10-CM

## 2025-07-17 RX ORDER — PREDNISONE 20 MG/1
10 TABLET ORAL DAILY
COMMUNITY
Start: 2025-07-13 | End: 2025-07-21

## 2025-07-17 RX ORDER — GUAIFENESIN 600 MG/1
1200 TABLET, EXTENDED RELEASE ORAL 2 TIMES DAILY
Qty: 28 TABLET | Refills: 0 | Status: SHIPPED | OUTPATIENT
Start: 2025-07-17

## 2025-07-17 RX ORDER — DEXTROMETHORPHAN HYDROBROMIDE AND PROMETHAZINE HYDROCHLORIDE 15; 6.25 MG/5ML; MG/5ML
5 SYRUP ORAL 4 TIMES DAILY PRN
Qty: 120 ML | Refills: 0 | Status: SHIPPED | OUTPATIENT
Start: 2025-07-17 | End: 2025-07-21

## 2025-07-17 NOTE — PROGRESS NOTES
Chief Complaint   Patient presents with    sinus congestion    Cough        Subjective     Melissa Ruth  has a past medical history of Alcoholism, Allergic (20 years ago), Allergies, Anxiety, Arthritis, Bursitis (09/05/2017), Chemical dependency, Depression, Gastrocnemius strain, left (09/05/2017), GERD (gastroesophageal reflux disease) (2010), History of transfusion (Around 1994), Hypothyroidism (08/18/2015), Neuromuscular disorder (09/05/2017), Night sweats, PONV (postoperative nausea and vomiting) (2015), Seasonal allergies, Substance abuse (1994), Thyroid disorder, and Ulcer (2010).    Patient went Sunday to an urgent care ([facility]). They gave her a steroid but hasn't finished it yet. She said her head and ears feel congested. Patient states her cough has been getting worse.        History of Present Illness  The patient is a 55-year-old female who presents today for congestion. She was seen at [facility] on Sunday and was given a steroid dose pack.    She sought medical attention at [facility] on 07/13/2025 due to fluid accumulation in her ear, which led to the prescription of a steroid dose pack. Despite this treatment, she reports no improvement in her condition. She describes a sensation of pressure in her right ear, but does not report any pain. Her symptoms have escalated since her last visit. She has not experienced any fever and is unaware of any recent exposure to sick individuals.     She has been using Robitussin to manage her cough, which has been disrupting her sleep, but it has not been effective. She does not have Mucinex at home for congestion relief. She has previously used Flonase, but it resulted in nosebleeds after a few days of use. She has not tried azelastine spray for her nose.    Social History:  Sleep: Reports disrupted sleep due to cough      Allergies   Allergen Reactions    Penicillins Itching     Positive on allergy skin test          Current Outpatient  Medications:     cetirizine (zyrTEC) 10 MG tablet, Take 1 tablet by mouth Daily., Disp: , Rfl:     estradiol (ESTRACE VAGINAL) 0.1 MG/GM vaginal cream, Place 0.5 gm PV and massage 0.5 gm to vulva and vestibule at night 2x/week, Disp: 42.5 g, Rfl: 3    estradiol (Minivelle) 0.05 MG/24HR patch, Place 1 patch on the skin as directed by provider 2 (Two) Times a Week., Disp: 24 patch, Rfl: 3    multivitamin with minerals tablet tablet, Take 1 tablet by mouth Daily., Disp: , Rfl:     prednisoLONE acetate (PRED FORTE) 1 % ophthalmic suspension, instill 1 drop in both eyes twice a day, Disp: , Rfl:     predniSONE (DELTASONE) 20 MG tablet, Take 0.5 tablets by mouth Daily., Disp: , Rfl:     Progesterone (PROMETRIUM) 100 MG capsule, Take 1 capsule by mouth Daily., Disp: , Rfl:     Testosterone 1.62 % gel, Place  on the skin as directed by provider., Disp: , Rfl:     Tirosint 88 MCG capsule, Take 1 capsule by mouth Daily., Disp: 90 capsule, Rfl: 1    valACYclovir (Valtrex) 1000 MG tablet, Take 2 tablets by mouth 2 (Two) Times a Day. (Patient taking differently: Take 2 tablets by mouth As Needed.), Disp: 4 tablet, Rfl: 5    vitamin D3 125 MCG (5000 UT) capsule capsule, Take 1 capsule by mouth Daily., Disp: 90 capsule, Rfl: 1    guaiFENesin (Mucinex) 600 MG 12 hr tablet, Take 2 tablets by mouth 2 (Two) Times a Day., Disp: 28 tablet, Rfl: 0    promethazine-dextromethorphan (PROMETHAZINE-DM) 6.25-15 MG/5ML syrup, Take 5 mL by mouth 4 (Four) Times a Day As Needed for Cough., Disp: 120 mL, Rfl: 0    Patient Active Problem List   Diagnosis    Alcoholism    Anxiety    Chemical dependency    Depression    Acquired hypothyroidism    Seasonal allergic rhinitis    Chronic fatigue syndrome    Menopausal symptom    Sleep disturbance    Vitamin D deficiency    Epigastric pain    Nausea    Gastroesophageal reflux disease    Decreased libido    Genitourinary syndrome of menopause        Past Surgical History:   Procedure Laterality Date     COLONOSCOPY      D & C HYSTEROSCOPY ENDOMETRIAL ABLATION N/A 2023    Procedure: DILATATION AND CURETTAGE HYSTEROSCOPY NOVASURE ENDOMETRIAL ABLATION;  Surgeon: Harshad Medrano MD;  Location: [facility];  Service: Gynecology;  Laterality: N/A;    ESSURE TUBAL LIGATION      KNEE SURGERY Bilateral     TONSILLECTOMY      UPPER GASTROINTESTINAL ENDOSCOPY  ’s    WRIST SURGERY Right 2014       Social History     Socioeconomic History    Marital status:    Tobacco Use    Smoking status: Former     Current packs/day: 0.00     Average packs/day: 1 pack/day for 20.1 years (20.1 ttl pk-yrs)     Types: Cigarettes     Start date: 2/10/1984     Quit date: 3/14/2004     Years since quittin.3     Passive exposure: Past    Smokeless tobacco: Never    Tobacco comments:     Smoked off and on through the years   Vaping Use    Vaping status: Never Used   Substance and Sexual Activity    Alcohol use: Not Currently    Drug use: Not Currently     Types: Marijuana    Sexual activity: Yes     Partners: Male     Birth control/protection: Surgical, Tubal ligation, Post-menopausal     Comment:        Family History   Problem Relation Age of Onset    Stroke Father     Arthritis Father     Alcohol abuse Father     Arthritis Mother     Lung cancer Other         UNCLE    Other Other         FAMILY HISTORY OF CERTAIN CHRONIC DISABLING DISEASES    Colon cancer Neg Hx     Malig Hyperthermia Neg Hx     Breast cancer Neg Hx     Ovarian cancer Neg Hx     Uterine cancer Neg Hx        Family history, surgical history, past medical history, Allergies and med's reviewed with patient today and updated in [employer] EMR.     ROS:  Review of Systems   HENT:  Positive for congestion and ear pain.    Respiratory:  Positive for cough.    All other systems reviewed and are negative.      OBJECTIVE:  Vitals:    25 1318   BP: 131/89   BP Location: Left arm   Patient Position: Sitting   Pulse: 68   Temp: 98 °F (36.7 °C)  "  SpO2: 97%   Weight: 60.8 kg (134 lb)   Height: 165.1 cm (65\")     Body mass index is 22.3 kg/m².  No LMP recorded. Patient has had an ablation.    Physical Exam  HENT:      Right Ear: A middle ear effusion is present. Tympanic membrane is not erythematous or bulging.      Left Ear:  No middle ear effusion. Tympanic membrane is not erythematous or bulging.      Nose:      Right Sinus: Frontal sinus tenderness present. No maxillary sinus tenderness.      Left Sinus: Frontal sinus tenderness present. No maxillary sinus tenderness.      Mouth/Throat:      Pharynx: Oropharynx is clear. Uvula midline.   Cardiovascular:      Rate and Rhythm: Normal rate and regular rhythm.      Pulses: Normal pulses.      Heart sounds: Normal heart sounds.   Pulmonary:      Effort: Pulmonary effort is normal.      Breath sounds: Normal breath sounds.   Lymphadenopathy:      Cervical: No cervical adenopathy.   Neurological:      General: No focal deficit present.      Mental Status: She is oriented to person, place, and time. Mental status is at baseline.         Physical Exam  Ears: Fluid noted in the right ear  Mouth/Throat: Oropharynx clear  Respiratory: Clear to auscultation, no wheezing, rales or rhonchi    Procedures     BMI is within normal parameters. No other follow-up for BMI required.      Health Maintenance Due   Topic Date Due    PAP SMEAR  12/08/2023        No visits with results within 30 Day(s) from this visit.   Latest known visit with results is:   Office Visit on 05/13/2025   Component Date Value Ref Range Status    Testosterone, Total 05/13/2025 179.7  ng/dL Final                             Female:                            Premenopausal    10.0 - 55.0                            Postmenopausal    7.0 - 40.0    Testosterone, Free 05/13/2025 1.1  0.0 - 4.2 pg/mL Final       Results        ASSESSMENT/ PLAN:    Diagnoses and all orders for this visit:    1. Acute non-recurrent frontal sinusitis (Primary)  -     " guaiFENesin (Mucinex) 600 MG 12 hr tablet; Take 2 tablets by mouth 2 (Two) Times a Day.  Dispense: 28 tablet; Refill: 0    2. Acute cough  -     promethazine-dextromethorphan (PROMETHAZINE-DM) 6.25-15 MG/5ML syrup; Take 5 mL by mouth 4 (Four) Times a Day As Needed for Cough.  Dispense: 120 mL; Refill: 0        Assessment & Plan  1. Congestion.  - Symptoms include fluid in the ear, pressure, and cough. The patient reports worsening symptoms despite the steroid dose pack.  - Physical exam reveals fluid in the right ear and tenderness in the sinuses. No fever or neck pain noted.  - Discussed the potential viral nature of the sinus infection and the plan to treat symptomatically for now. If symptoms persist beyond early next week, antibiotic therapy may be considered.  - Prescribed a stronger cough suppressant to aid sleep, and Mucinex for congestion relief. All prescriptions will be sent to the pharmacy.    Orders Placed Today:     New Medications Ordered This Visit   Medications    promethazine-dextromethorphan (PROMETHAZINE-DM) 6.25-15 MG/5ML syrup     Sig: Take 5 mL by mouth 4 (Four) Times a Day As Needed for Cough.     Dispense:  120 mL     Refill:  0    guaiFENesin (Mucinex) 600 MG 12 hr tablet     Sig: Take 2 tablets by mouth 2 (Two) Times a Day.     Dispense:  28 tablet     Refill:  0        Management Plan:     An After Visit Summary was printed and given to the patient at discharge.    Follow-up: No follow-ups on file.    Patient or patient representative verbalized consent for the use of Ambient Listening during the visit with  SONNY Rutledge for chart documentation. 7/17/2025  13:38 EDT    SONNY Rutledge 7/17/2025 13:47 EDT  This note was electronically signed.

## 2025-07-21 ENCOUNTER — OFFICE VISIT (OUTPATIENT)
Dept: FAMILY MEDICINE CLINIC | Facility: CLINIC | Age: 55
End: 2025-07-21
Payer: COMMERCIAL

## 2025-07-21 ENCOUNTER — LAB (OUTPATIENT)
Dept: LAB | Facility: HOSPITAL | Age: 55
End: 2025-07-21
Payer: COMMERCIAL

## 2025-07-21 VITALS
WEIGHT: 131.2 LBS | HEART RATE: 79 BPM | DIASTOLIC BLOOD PRESSURE: 82 MMHG | BODY MASS INDEX: 21.83 KG/M2 | OXYGEN SATURATION: 97 % | SYSTOLIC BLOOD PRESSURE: 115 MMHG | RESPIRATION RATE: 16 BRPM

## 2025-07-21 DIAGNOSIS — R79.89 ELEVATED LFTS: Primary | ICD-10-CM

## 2025-07-21 DIAGNOSIS — B00.1 HERPES LABIALIS: ICD-10-CM

## 2025-07-21 DIAGNOSIS — E55.9 VITAMIN D DEFICIENCY: Chronic | ICD-10-CM

## 2025-07-21 DIAGNOSIS — Z78.0 MENOPAUSE: ICD-10-CM

## 2025-07-21 DIAGNOSIS — E03.9 ACQUIRED HYPOTHYROIDISM: Chronic | ICD-10-CM

## 2025-07-21 DIAGNOSIS — R79.89 ELEVATED LFTS: ICD-10-CM

## 2025-07-21 DIAGNOSIS — L30.9 DERMATITIS: ICD-10-CM

## 2025-07-21 DIAGNOSIS — H65.03 NON-RECURRENT ACUTE SEROUS OTITIS MEDIA OF BOTH EARS: ICD-10-CM

## 2025-07-21 LAB
25(OH)D3 SERPL-MCNC: 80.6 NG/ML (ref 30–100)
ALBUMIN SERPL-MCNC: 4.5 G/DL (ref 3.5–5.2)
ALBUMIN/GLOB SERPL: 1.6 G/DL
ALP SERPL-CCNC: 100 U/L (ref 39–117)
ALT SERPL W P-5'-P-CCNC: 30 U/L (ref 1–33)
ANION GAP SERPL CALCULATED.3IONS-SCNC: 15 MMOL/L (ref 5–15)
AST SERPL-CCNC: 51 U/L (ref 1–32)
BILIRUB SERPL-MCNC: 0.3 MG/DL (ref 0–1.2)
BUN SERPL-MCNC: 18 MG/DL (ref 6–20)
BUN/CREAT SERPL: 19.6 (ref 7–25)
CALCIUM SPEC-SCNC: 9.5 MG/DL (ref 8.6–10.5)
CHLORIDE SERPL-SCNC: 100 MMOL/L (ref 98–107)
CO2 SERPL-SCNC: 23 MMOL/L (ref 22–29)
CREAT SERPL-MCNC: 0.92 MG/DL (ref 0.57–1)
EGFRCR SERPLBLD CKD-EPI 2021: 73.7 ML/MIN/1.73
GLOBULIN UR ELPH-MCNC: 2.9 GM/DL
GLUCOSE SERPL-MCNC: 75 MG/DL (ref 65–99)
POTASSIUM SERPL-SCNC: 3.9 MMOL/L (ref 3.5–5.2)
PROT SERPL-MCNC: 7.4 G/DL (ref 6–8.5)
SODIUM SERPL-SCNC: 138 MMOL/L (ref 136–145)
T4 FREE SERPL-MCNC: 1.52 NG/DL (ref 0.92–1.68)
TSH SERPL DL<=0.05 MIU/L-ACNC: 0.9 UIU/ML (ref 0.27–4.2)

## 2025-07-21 PROCEDURE — 99214 OFFICE O/P EST MOD 30 MIN: CPT | Performed by: PHYSICIAN ASSISTANT

## 2025-07-21 PROCEDURE — 36415 COLL VENOUS BLD VENIPUNCTURE: CPT

## 2025-07-21 PROCEDURE — 84439 ASSAY OF FREE THYROXINE: CPT

## 2025-07-21 PROCEDURE — 84403 ASSAY OF TOTAL TESTOSTERONE: CPT

## 2025-07-21 PROCEDURE — 82306 VITAMIN D 25 HYDROXY: CPT

## 2025-07-21 PROCEDURE — 84443 ASSAY THYROID STIM HORMONE: CPT

## 2025-07-21 PROCEDURE — 84402 ASSAY OF FREE TESTOSTERONE: CPT

## 2025-07-21 PROCEDURE — 80053 COMPREHEN METABOLIC PANEL: CPT

## 2025-07-21 RX ORDER — LEVOTHYROXINE SODIUM 88 UG/1
88 CAPSULE ORAL DAILY
Qty: 90 CAPSULE | Refills: 1 | Status: SHIPPED | OUTPATIENT
Start: 2025-07-21

## 2025-07-21 RX ORDER — VALACYCLOVIR HYDROCHLORIDE 1 G/1
2000 TABLET, FILM COATED ORAL 2 TIMES DAILY
Qty: 4 TABLET | Refills: 5 | Status: SHIPPED | OUTPATIENT
Start: 2025-07-21

## 2025-07-21 RX ORDER — TRIAMCINOLONE ACETONIDE 1 MG/G
1 CREAM TOPICAL 2 TIMES DAILY
Qty: 45 G | Refills: 0 | Status: SHIPPED | OUTPATIENT
Start: 2025-07-21

## 2025-07-21 NOTE — PROGRESS NOTES
Chief Complaint  Chief Complaint   Patient presents with    Follow-up     6 month     Hypothyroidism       Subjective          Melissa Ruth presents to [facility] for 6mth follow up.    History of Present Illness    Hypothyroidism: Patient is currently on Tirosint 88 MCG and doing well. Patient states energy is good. Patient denies weight changes, bowel changes and changes in hair, skin and nails.      Vitamin D deficiency: Patient is currently on Vitamin D3 125 MCG for vitamin D deficiency. Patient denies any fatigue and muscle cramping.    Marcela put patient on testosterone gel, progesterone, and estrogen. Then followed with ROSALIA Hull who put on estrogen cream and patch. Testosterone was elevated. Still on testosterone 2-3 days/week (decreased in May) and recheck levels.    Recently seen for sinus infection; still with ear fullness.    Rash on left lower leg; treated with oral steroid; improved but now back.     Labs: 1/27/25  --triglycerides (sugary, starchy part of your cholesterol) are elevated; decrease sugars and carbs in diet   --LDL minimally elevated; decrease fats and fried foods in diet and increase exercise.   --LFTs minimally elevated; any ETOH or increased tylenol usage? Will recheck in 4-6 weeks/ordered. Recheck normal.  --vitamin D a bit elevated; decrease dose but don't stop.   --thyroid normal   --hormone labs appear stable for menopause     Medical History: has a past medical history of Alcoholism, Allergic (20 years ago), Allergies, Anxiety, Arthritis, Bursitis (09/05/2017), Chemical dependency, Depression, Gastrocnemius strain, left (09/05/2017), GERD (gastroesophageal reflux disease) (2010), History of transfusion (Around 1994), Hypothyroidism (08/18/2015), Neuromuscular disorder (09/05/2017), Night sweats, PONV (postoperative nausea and vomiting) (2015), Seasonal allergies, Substance abuse (1994), Thyroid disorder, and Ulcer (2010).   Surgical History: has  a past surgical history that includes Colonoscopy (2020); Knee surgery (Bilateral, 1997); Tonsillectomy; Wrist surgery (Right, 2014); Upper gastrointestinal endoscopy (2000’s); Essure tubal ligation; and d & c hysteroscopy endometrial ablation (N/A, 02/03/2023).   Family History: family history includes Alcohol abuse in her father; Arthritis in her father and mother; Lung cancer in an other family member; Other in an other family member; Stroke in her father.   Social History: reports that she quit smoking about 21 years ago. Her smoking use included cigarettes. She started smoking about 41 years ago. She has a 20.1 pack-year smoking history. She has been exposed to tobacco smoke. She has never used smokeless tobacco. She reports that she does not currently use alcohol. She reports that she does not currently use drugs after having used the following drugs: Marijuana.    Allergies: Penicillins    Health Maintenance Due   Topic Date Due    PAP SMEAR  12/08/2023       Objective     Vitals:    07/21/25 1001   BP: 115/82   BP Location: Left arm   Patient Position: Sitting   Cuff Size: Adult   Pulse: 79   Resp: 16   SpO2: 97%   Weight: 59.5 kg (131 lb 3.2 oz)     Body mass index is 21.83 kg/m².     Wt Readings from Last 3 Encounters:   07/21/25 59.5 kg (131 lb 3.2 oz)   07/17/25 60.8 kg (134 lb)   05/13/25 61.2 kg (135 lb)     BP Readings from Last 3 Encounters:   07/21/25 115/82   07/17/25 131/89   05/13/25 109/76       Patient Care Team:  Lluvia Perry PA as PCP - General (Family Medicine)  Heber AprilSONNY as Nurse Practitioner (Nurse Practitioner)  Huan Caro MD as Consulting Physician (Gastroenterology)  Tracey Smart APRN as Nurse Practitioner (Gastroenterology)    Physical Exam  Vitals and nursing note reviewed.   Constitutional:       Appearance: Normal appearance.   HENT:      Head: Normocephalic and atraumatic.      Right Ear: Ear canal normal.      Left Ear: Ear canal normal.       Ears:      Comments: Bilateral serous effusion.  Neck:      Vascular: No carotid bruit.      Comments: Thyroid : gland size normal, nontender, no nodules or masses present on palpation   Cardiovascular:      Rate and Rhythm: Normal rate and regular rhythm.      Pulses: Normal pulses.      Heart sounds: Normal heart sounds.   Pulmonary:      Effort: Pulmonary effort is normal.      Breath sounds: Normal breath sounds.   Musculoskeletal:      Cervical back: Neck supple. No tenderness.      Right lower leg: No edema.      Left lower leg: No edema.   Lymphadenopathy:      Cervical: No cervical adenopathy.   Skin:     Findings: Rash (left lower leg) present.   Neurological:      Mental Status: She is alert.   Psychiatric:         Mood and Affect: Mood normal.         Behavior: Behavior normal.           Result Review :              Assessment and Plan      Diagnoses and all orders for this visit:    1. Elevated LFTs (Primary)  -     Comprehensive Metabolic Panel; Future    2. Acquired hypothyroidism  Comments:  Stable; continue with Tirosint 88mcg daily; check labs and f/u in 6mths.  Orders:  -     TSH; Future  -     T4, free; Future  -     Tirosint 88 MCG capsule; Take 1 capsule by mouth Daily.  Dispense: 90 capsule; Refill: 1    3. Vitamin D deficiency  Comments:  Check labs for stability  Orders:  -     Vitamin D 25 hydroxy; Future    4. Menopause  Comments:  Recheck testosterone; continue with GYN  Orders:  -     Testosterone (Free & Total), LC / MS; Future    5. Herpes labialis  Comments:  Not currently with outbreak but needs refills for when an outbreak were to occur.  Orders:  -     valACYclovir (Valtrex) 1000 MG tablet; Take 2 tablets by mouth 2 (Two) Times a Day.  Dispense: 4 tablet; Refill: 5    6. Dermatitis  Comments:  [place]: Treat with triamcinolone cream twice daily as needed  Orders:  -     triamcinolone (KENALOG) 0.1 % cream; Apply 1 Application topically to the appropriate area as directed 2 (Two)  Times a Day.  Dispense: 45 g; Refill: 0    7. Non-recurrent acute serous otitis media of both ears  Comments:  Acute problem: Treat with Nasacort OTC for the next 10 to 14 days.            Follow Up     Return in about 6 months (around 1/21/2026).    Patient was given instructions and counseling regarding her condition or for health maintenance advice. Please see specific information pulled into the AVS if appropriate.

## 2025-07-26 LAB
TESTOST FREE SERPL-MCNC: 0.5 PG/ML (ref 0–4.2)
TESTOST SERPL-MCNC: 42.7 NG/DL

## 2025-08-12 ENCOUNTER — OFFICE VISIT (OUTPATIENT)
Dept: OBSTETRICS AND GYNECOLOGY | Age: 55
End: 2025-08-12
Payer: COMMERCIAL

## 2025-08-12 VITALS
HEART RATE: 64 BPM | BODY MASS INDEX: 22.3 KG/M2 | DIASTOLIC BLOOD PRESSURE: 71 MMHG | WEIGHT: 134 LBS | SYSTOLIC BLOOD PRESSURE: 107 MMHG

## 2025-08-12 DIAGNOSIS — N95.8 GENITOURINARY SYNDROME OF MENOPAUSE: ICD-10-CM

## 2025-08-12 DIAGNOSIS — N95.1 MENOPAUSAL SYMPTOMS: Primary | ICD-10-CM

## 2025-08-12 DIAGNOSIS — R68.82 DECREASED LIBIDO: ICD-10-CM

## 2025-08-12 RX ORDER — PROGESTERONE 200 MG/1
200 CAPSULE ORAL DAILY
Qty: 90 CAPSULE | Refills: 4 | Status: SHIPPED | OUTPATIENT
Start: 2025-08-12

## 2025-08-12 RX ORDER — TESTOSTERONE GEL, 1% 10 MG/G
GEL TRANSDERMAL
Qty: 150 G | Refills: 5 | Status: SHIPPED | OUTPATIENT
Start: 2025-08-12

## 2025-08-12 RX ORDER — ESTRADIOL 0.07 MG/D
1 FILM, EXTENDED RELEASE TRANSDERMAL 2 TIMES WEEKLY
Qty: 24 EACH | Refills: 3 | Status: SHIPPED | OUTPATIENT
Start: 2025-08-14

## (undated) DEVICE — SOL IRRG H2O PL/BG 1000ML STRL

## (undated) DEVICE — CATH URETH INTRMIT ALLPURP LTX 16F RED

## (undated) DEVICE — TOWEL,OR,DSP,ST,BLUE,STD,4/PK,20PK/CS: Brand: MEDLINE

## (undated) DEVICE — LITHOTOMY-SLINGS: Brand: MEDLINE INDUSTRIES, INC.

## (undated) DEVICE — 1000ML,PRESSURE INFUSER W/STOPCOCK: Brand: MEDLINE

## (undated) DEVICE — PROB ABL ENDOMTRL NOVASURE/G4 W/SURESND

## (undated) DEVICE — SKIN PREP TRAY W/CHG: Brand: MEDLINE INDUSTRIES, INC.

## (undated) DEVICE — GLV SURG SENSICARE ORTHO PF LF 7 STRL

## (undated) DEVICE — GOWN,REINFORCE,POLY,SIRUS,BREATH SLV,XLG: Brand: MEDLINE